# Patient Record
Sex: FEMALE | Race: WHITE | NOT HISPANIC OR LATINO | Employment: OTHER
[De-identification: names, ages, dates, MRNs, and addresses within clinical notes are randomized per-mention and may not be internally consistent; named-entity substitution may affect disease eponyms.]

---

## 2017-06-19 ENCOUNTER — RECORDS - HEALTHEAST (OUTPATIENT)
Dept: ADMINISTRATIVE | Facility: OTHER | Age: 67
End: 2017-06-19

## 2017-07-31 ENCOUNTER — OFFICE VISIT - HEALTHEAST (OUTPATIENT)
Dept: FAMILY MEDICINE | Facility: CLINIC | Age: 67
End: 2017-07-31

## 2017-07-31 ENCOUNTER — RECORDS - HEALTHEAST (OUTPATIENT)
Dept: ADMINISTRATIVE | Facility: OTHER | Age: 67
End: 2017-07-31

## 2017-07-31 ENCOUNTER — COMMUNICATION - HEALTHEAST (OUTPATIENT)
Dept: OBGYN | Facility: HOSPITAL | Age: 67
End: 2017-07-31

## 2017-07-31 DIAGNOSIS — B02.9 SHINGLES: ICD-10-CM

## 2017-07-31 DIAGNOSIS — R51.9 LEFT-SIDED HEADACHE: ICD-10-CM

## 2017-08-01 ENCOUNTER — COMMUNICATION - HEALTHEAST (OUTPATIENT)
Dept: SCHEDULING | Facility: CLINIC | Age: 67
End: 2017-08-01

## 2017-08-02 ENCOUNTER — COMMUNICATION - HEALTHEAST (OUTPATIENT)
Dept: OBGYN | Facility: HOSPITAL | Age: 67
End: 2017-08-02

## 2017-08-02 ENCOUNTER — RECORDS - HEALTHEAST (OUTPATIENT)
Dept: ADMINISTRATIVE | Facility: OTHER | Age: 67
End: 2017-08-02

## 2017-08-02 ENCOUNTER — OFFICE VISIT - HEALTHEAST (OUTPATIENT)
Dept: FAMILY MEDICINE | Facility: CLINIC | Age: 67
End: 2017-08-02

## 2017-08-02 DIAGNOSIS — R51.9 LEFT-SIDED HEADACHE: ICD-10-CM

## 2017-08-02 DIAGNOSIS — B02.9 SHINGLES: ICD-10-CM

## 2017-08-02 DIAGNOSIS — R11.0 NAUSEA: ICD-10-CM

## 2017-08-03 ENCOUNTER — OFFICE VISIT - HEALTHEAST (OUTPATIENT)
Dept: INTERNAL MEDICINE | Facility: CLINIC | Age: 67
End: 2017-08-03

## 2017-08-03 DIAGNOSIS — B02.8 HERPES ZOSTER WITH OTHER COMPLICATION: ICD-10-CM

## 2017-08-10 ENCOUNTER — OFFICE VISIT - HEALTHEAST (OUTPATIENT)
Dept: INTERNAL MEDICINE | Facility: CLINIC | Age: 67
End: 2017-08-10

## 2017-08-10 DIAGNOSIS — H61.22 IMPACTED CERUMEN OF LEFT EAR: ICD-10-CM

## 2017-08-10 DIAGNOSIS — B02.23 ACUTE HERPES ZOSTER NEUROPATHY: ICD-10-CM

## 2017-08-15 ENCOUNTER — OFFICE VISIT - HEALTHEAST (OUTPATIENT)
Dept: FAMILY MEDICINE | Facility: CLINIC | Age: 67
End: 2017-08-15

## 2017-08-15 DIAGNOSIS — Z12.31 VISIT FOR SCREENING MAMMOGRAM: ICD-10-CM

## 2017-08-15 DIAGNOSIS — R53.83 FATIGUE, UNSPECIFIED TYPE: ICD-10-CM

## 2017-08-15 DIAGNOSIS — Z12.11 SCREENING FOR COLON CANCER: ICD-10-CM

## 2017-08-15 DIAGNOSIS — E66.01 MORBID OBESITY WITH BMI OF 40.0-44.9, ADULT (H): ICD-10-CM

## 2017-08-15 DIAGNOSIS — Z13.1 SCREENING FOR DIABETES MELLITUS: ICD-10-CM

## 2017-08-15 DIAGNOSIS — B02.23 ACUTE HERPES ZOSTER NEUROPATHY: ICD-10-CM

## 2017-08-15 LAB — HBA1C MFR BLD: 5.6 % (ref 3.5–6)

## 2017-08-15 ASSESSMENT — MIFFLIN-ST. JEOR: SCORE: 1521.2

## 2017-08-21 ENCOUNTER — AMBULATORY - HEALTHEAST (OUTPATIENT)
Dept: LAB | Facility: CLINIC | Age: 67
End: 2017-08-21

## 2017-08-21 DIAGNOSIS — Z12.11 COLON CANCER SCREENING: ICD-10-CM

## 2017-10-12 ENCOUNTER — AMBULATORY - HEALTHEAST (OUTPATIENT)
Dept: LAB | Facility: CLINIC | Age: 67
End: 2017-10-12

## 2017-10-12 DIAGNOSIS — B35.1 ONYCHOMYCOSIS: ICD-10-CM

## 2017-10-23 ENCOUNTER — RECORDS - HEALTHEAST (OUTPATIENT)
Dept: ADMINISTRATIVE | Facility: OTHER | Age: 67
End: 2017-10-23

## 2017-11-02 ENCOUNTER — RECORDS - HEALTHEAST (OUTPATIENT)
Dept: ADMINISTRATIVE | Facility: OTHER | Age: 67
End: 2017-11-02

## 2017-12-01 ENCOUNTER — RECORDS - HEALTHEAST (OUTPATIENT)
Dept: ADMINISTRATIVE | Facility: OTHER | Age: 67
End: 2017-12-01

## 2017-12-14 ENCOUNTER — RECORDS - HEALTHEAST (OUTPATIENT)
Dept: ADMINISTRATIVE | Facility: OTHER | Age: 67
End: 2017-12-14

## 2018-02-08 ENCOUNTER — RECORDS - HEALTHEAST (OUTPATIENT)
Dept: ADMINISTRATIVE | Facility: OTHER | Age: 68
End: 2018-02-08

## 2018-02-13 ENCOUNTER — RECORDS - HEALTHEAST (OUTPATIENT)
Dept: ADMINISTRATIVE | Facility: OTHER | Age: 68
End: 2018-02-13

## 2018-02-22 ENCOUNTER — RECORDS - HEALTHEAST (OUTPATIENT)
Dept: ADMINISTRATIVE | Facility: OTHER | Age: 68
End: 2018-02-22

## 2018-03-02 ENCOUNTER — RECORDS - HEALTHEAST (OUTPATIENT)
Dept: ADMINISTRATIVE | Facility: OTHER | Age: 68
End: 2018-03-02

## 2018-03-16 ENCOUNTER — RECORDS - HEALTHEAST (OUTPATIENT)
Dept: ADMINISTRATIVE | Facility: OTHER | Age: 68
End: 2018-03-16

## 2018-03-22 ENCOUNTER — RECORDS - HEALTHEAST (OUTPATIENT)
Dept: ADMINISTRATIVE | Facility: OTHER | Age: 68
End: 2018-03-22

## 2018-04-19 ENCOUNTER — RECORDS - HEALTHEAST (OUTPATIENT)
Dept: ADMINISTRATIVE | Facility: OTHER | Age: 68
End: 2018-04-19

## 2018-06-22 ENCOUNTER — RECORDS - HEALTHEAST (OUTPATIENT)
Dept: ADMINISTRATIVE | Facility: OTHER | Age: 68
End: 2018-06-22

## 2018-06-28 ENCOUNTER — AMBULATORY - HEALTHEAST (OUTPATIENT)
Dept: LAB | Facility: CLINIC | Age: 68
End: 2018-06-28

## 2018-06-28 DIAGNOSIS — M76.62 TENDONITIS, ACHILLES, LEFT: ICD-10-CM

## 2018-06-29 LAB
25(OH)D3 SERPL-MCNC: 31.5 NG/ML (ref 30–80)
25(OH)D3 SERPL-MCNC: 31.5 NG/ML (ref 30–80)

## 2018-07-05 ENCOUNTER — COMMUNICATION - HEALTHEAST (OUTPATIENT)
Dept: SCHEDULING | Facility: CLINIC | Age: 68
End: 2018-07-05

## 2018-07-06 ENCOUNTER — OFFICE VISIT - HEALTHEAST (OUTPATIENT)
Dept: FAMILY MEDICINE | Facility: CLINIC | Age: 68
End: 2018-07-06

## 2018-07-06 DIAGNOSIS — R23.3 PETECHIAE OR ECCHYMOSES: ICD-10-CM

## 2018-07-06 DIAGNOSIS — R23.3 EASY BRUISING: ICD-10-CM

## 2018-07-06 DIAGNOSIS — D69.6 THROMBOCYTOPENIA (H): ICD-10-CM

## 2018-07-06 DIAGNOSIS — R23.3 PETECHIAE OF PALATE: ICD-10-CM

## 2018-07-06 LAB
APTT PPP: 29 SECONDS (ref 24–37)
BASOPHILS # BLD AUTO: 0.1 THOU/UL (ref 0–0.2)
BASOPHILS NFR BLD AUTO: 1 % (ref 0–2)
EOSINOPHIL # BLD AUTO: 0.1 THOU/UL (ref 0–0.4)
EOSINOPHIL NFR BLD AUTO: 1 % (ref 0–6)
ERYTHROCYTE [DISTWIDTH] IN BLOOD BY AUTOMATED COUNT: 13.4 % (ref 11–14.5)
HCT VFR BLD AUTO: 42.7 % (ref 35–47)
HGB BLD-MCNC: 14.5 G/DL (ref 12–16)
INR PPP: 1 (ref 0.9–1.1)
LYMPHOCYTES # BLD AUTO: 2.2 THOU/UL (ref 0.8–4.4)
LYMPHOCYTES NFR BLD AUTO: 30 % (ref 20–40)
MCH RBC QN AUTO: 30.1 PG (ref 27–34)
MCHC RBC AUTO-ENTMCNC: 34 G/DL (ref 32–36)
MCV RBC AUTO: 89 FL (ref 80–100)
MONOCYTES # BLD AUTO: 0.8 THOU/UL (ref 0–0.9)
MONOCYTES NFR BLD AUTO: 11 % (ref 2–10)
NEUTROPHILS # BLD AUTO: 4.1 THOU/UL (ref 2–7.7)
NEUTROPHILS NFR BLD AUTO: 57 % (ref 50–70)
PLATELET # BLD AUTO: 0 THOU/UL (ref 140–440)
PMV BLD AUTO: ABNORMAL FL (ref 8.5–12.5)
RBC # BLD AUTO: 4.81 MILL/UL (ref 3.8–5.4)
WBC: 7.3 THOU/UL (ref 4–11)

## 2018-07-06 ASSESSMENT — MIFFLIN-ST. JEOR: SCORE: 1424.47

## 2018-07-10 ENCOUNTER — OFFICE VISIT - HEALTHEAST (OUTPATIENT)
Dept: FAMILY MEDICINE | Facility: CLINIC | Age: 68
End: 2018-07-10

## 2018-07-10 ENCOUNTER — AMBULATORY - HEALTHEAST (OUTPATIENT)
Dept: ONCOLOGY | Facility: HOSPITAL | Age: 68
End: 2018-07-10

## 2018-07-10 DIAGNOSIS — D69.3 ACUTE ITP (H): ICD-10-CM

## 2018-07-10 DIAGNOSIS — Z09 HOSPITAL DISCHARGE FOLLOW-UP: ICD-10-CM

## 2018-07-10 DIAGNOSIS — E66.812 OBESITY, CLASS II, BMI 35-39.9: ICD-10-CM

## 2018-07-10 LAB
ALBUMIN SERPL-MCNC: 3.9 G/DL (ref 3.5–5)
ALP SERPL-CCNC: 92 U/L (ref 45–120)
ALT SERPL W P-5'-P-CCNC: 25 U/L (ref 0–45)
ANION GAP SERPL CALCULATED.3IONS-SCNC: 10 MMOL/L (ref 5–18)
AST SERPL W P-5'-P-CCNC: 16 U/L (ref 0–40)
BASOPHILS # BLD AUTO: 0.1 THOU/UL (ref 0–0.2)
BASOPHILS NFR BLD AUTO: 0 % (ref 0–2)
BILIRUB SERPL-MCNC: 2.4 MG/DL (ref 0–1)
BUN SERPL-MCNC: 23 MG/DL (ref 8–22)
CALCIUM SERPL-MCNC: 10.2 MG/DL (ref 8.5–10.5)
CHLORIDE BLD-SCNC: 108 MMOL/L (ref 98–107)
CO2 SERPL-SCNC: 22 MMOL/L (ref 22–31)
CREAT SERPL-MCNC: 0.86 MG/DL (ref 0.6–1.1)
EOSINOPHIL # BLD AUTO: 0 THOU/UL (ref 0–0.4)
EOSINOPHIL NFR BLD AUTO: 0 % (ref 0–6)
ERYTHROCYTE [DISTWIDTH] IN BLOOD BY AUTOMATED COUNT: 14.2 % (ref 11–14.5)
GFR SERPL CREATININE-BSD FRML MDRD: >60 ML/MIN/1.73M2
GLUCOSE BLD-MCNC: 99 MG/DL (ref 70–125)
HCT VFR BLD AUTO: 38 % (ref 35–47)
HGB BLD-MCNC: 13.3 G/DL (ref 12–16)
LYMPHOCYTES # BLD AUTO: 2 THOU/UL (ref 0.8–4.4)
LYMPHOCYTES NFR BLD AUTO: 14 % (ref 20–40)
MCH RBC QN AUTO: 30.9 PG (ref 27–34)
MCHC RBC AUTO-ENTMCNC: 35 G/DL (ref 32–36)
MCV RBC AUTO: 88 FL (ref 80–100)
MONOCYTES # BLD AUTO: 1.6 THOU/UL (ref 0–0.9)
MONOCYTES NFR BLD AUTO: 11 % (ref 2–10)
NEUTROPHILS # BLD AUTO: 10.2 THOU/UL (ref 2–7.7)
NEUTROPHILS NFR BLD AUTO: 75 % (ref 50–70)
PLATELET # BLD AUTO: 75 THOU/UL (ref 140–440)
PMV BLD AUTO: 12.6 FL (ref 8.5–12.5)
POTASSIUM BLD-SCNC: 3.7 MMOL/L (ref 3.5–5)
PROT SERPL-MCNC: 6.3 G/DL (ref 6–8)
RBC # BLD AUTO: 4.31 MILL/UL (ref 3.8–5.4)
SODIUM SERPL-SCNC: 140 MMOL/L (ref 136–145)
WBC: 14.4 THOU/UL (ref 4–11)

## 2018-07-10 ASSESSMENT — MIFFLIN-ST. JEOR: SCORE: 1418.57

## 2018-07-11 ENCOUNTER — AMBULATORY - HEALTHEAST (OUTPATIENT)
Dept: LAB | Facility: CLINIC | Age: 68
End: 2018-07-11

## 2018-07-11 DIAGNOSIS — D69.3 ACUTE ITP (H): ICD-10-CM

## 2018-07-12 LAB
H PYLORI AG STL QL IA: ABNORMAL
REPORT STATUS: ABNORMAL
SPECIMEN DESCRIPTION: ABNORMAL

## 2018-07-25 ENCOUNTER — AMBULATORY - HEALTHEAST (OUTPATIENT)
Dept: INFUSION THERAPY | Facility: HOSPITAL | Age: 68
End: 2018-07-25

## 2018-07-25 ENCOUNTER — OFFICE VISIT - HEALTHEAST (OUTPATIENT)
Dept: ONCOLOGY | Facility: HOSPITAL | Age: 68
End: 2018-07-25

## 2018-07-25 DIAGNOSIS — D69.3 ACUTE ITP (H): ICD-10-CM

## 2018-07-25 DIAGNOSIS — R23.3 PETECHIAE OF PALATE: ICD-10-CM

## 2018-07-25 DIAGNOSIS — R23.3 EASY BRUISING: ICD-10-CM

## 2018-07-25 DIAGNOSIS — D69.3 ITP SECONDARY TO INFECTION (H): ICD-10-CM

## 2018-07-25 DIAGNOSIS — R23.3 PETECHIAE OR ECCHYMOSES: ICD-10-CM

## 2018-07-25 DIAGNOSIS — A04.8 HELICOBACTER PYLORI (H. PYLORI) INFECTION: ICD-10-CM

## 2018-07-25 LAB
BASOPHILS # BLD AUTO: 0 THOU/UL (ref 0–0.2)
BASOPHILS NFR BLD AUTO: 0 % (ref 0–2)
EOSINOPHIL # BLD AUTO: 0 THOU/UL (ref 0–0.4)
EOSINOPHIL NFR BLD AUTO: 0 % (ref 0–6)
ERYTHROCYTE [DISTWIDTH] IN BLOOD BY AUTOMATED COUNT: 15.3 % (ref 11–14.5)
HCT VFR BLD AUTO: 37.8 % (ref 35–47)
HGB BLD-MCNC: 12.3 G/DL (ref 12–16)
LYMPHOCYTES # BLD AUTO: 3.7 THOU/UL (ref 0.8–4.4)
LYMPHOCYTES NFR BLD AUTO: 30 % (ref 20–40)
MCH RBC QN AUTO: 30.2 PG (ref 27–34)
MCHC RBC AUTO-ENTMCNC: 32.5 G/DL (ref 32–36)
MCV RBC AUTO: 93 FL (ref 80–100)
MONOCYTES # BLD AUTO: 1.3 THOU/UL (ref 0–0.9)
MONOCYTES NFR BLD AUTO: 11 % (ref 2–10)
NEUTROPHILS # BLD AUTO: 6.9 THOU/UL (ref 2–7.7)
NEUTROPHILS NFR BLD AUTO: 59 % (ref 50–70)
PLATELET # BLD AUTO: 95 THOU/UL (ref 140–440)
PMV BLD AUTO: 11.4 FL (ref 8.5–12.5)
RBC # BLD AUTO: 4.07 MILL/UL (ref 3.8–5.4)
WBC: 12.3 THOU/UL (ref 4–11)

## 2018-07-26 ENCOUNTER — COMMUNICATION - HEALTHEAST (OUTPATIENT)
Dept: ONCOLOGY | Facility: HOSPITAL | Age: 68
End: 2018-07-26

## 2018-08-08 ENCOUNTER — AMBULATORY - HEALTHEAST (OUTPATIENT)
Dept: INFUSION THERAPY | Facility: HOSPITAL | Age: 68
End: 2018-08-08

## 2018-08-08 ENCOUNTER — OFFICE VISIT - HEALTHEAST (OUTPATIENT)
Dept: ONCOLOGY | Facility: HOSPITAL | Age: 68
End: 2018-08-08

## 2018-08-08 DIAGNOSIS — A04.8 HELICOBACTER PYLORI (H. PYLORI) INFECTION: ICD-10-CM

## 2018-08-08 DIAGNOSIS — D69.3 ITP SECONDARY TO INFECTION (H): ICD-10-CM

## 2018-08-08 DIAGNOSIS — D69.3 ACUTE ITP (H): ICD-10-CM

## 2018-08-08 LAB
ALBUMIN SERPL-MCNC: 3.9 G/DL (ref 3.5–5)
ALP SERPL-CCNC: 95 U/L (ref 45–120)
ALT SERPL W P-5'-P-CCNC: 31 U/L (ref 0–45)
ANION GAP SERPL CALCULATED.3IONS-SCNC: 9 MMOL/L (ref 5–18)
AST SERPL W P-5'-P-CCNC: 22 U/L (ref 0–40)
BASOPHILS # BLD AUTO: 0.1 THOU/UL (ref 0–0.2)
BASOPHILS NFR BLD AUTO: 1 % (ref 0–2)
BILIRUB SERPL-MCNC: 1.5 MG/DL (ref 0–1)
BUN SERPL-MCNC: 16 MG/DL (ref 8–22)
CALCIUM SERPL-MCNC: 10.3 MG/DL (ref 8.5–10.5)
CHLORIDE BLD-SCNC: 106 MMOL/L (ref 98–107)
CO2 SERPL-SCNC: 27 MMOL/L (ref 22–31)
CREAT SERPL-MCNC: 0.97 MG/DL (ref 0.6–1.1)
EOSINOPHIL # BLD AUTO: 0.1 THOU/UL (ref 0–0.4)
EOSINOPHIL NFR BLD AUTO: 1 % (ref 0–6)
ERYTHROCYTE [DISTWIDTH] IN BLOOD BY AUTOMATED COUNT: 15.1 % (ref 11–14.5)
GFR SERPL CREATININE-BSD FRML MDRD: 57 ML/MIN/1.73M2
GLUCOSE BLD-MCNC: 97 MG/DL (ref 70–125)
HCT VFR BLD AUTO: 41.3 % (ref 35–47)
HGB BLD-MCNC: 13.6 G/DL (ref 12–16)
LYMPHOCYTES # BLD AUTO: 2 THOU/UL (ref 0.8–4.4)
LYMPHOCYTES NFR BLD AUTO: 38 % (ref 20–40)
MCH RBC QN AUTO: 30.4 PG (ref 27–34)
MCHC RBC AUTO-ENTMCNC: 32.9 G/DL (ref 32–36)
MCV RBC AUTO: 92 FL (ref 80–100)
MONOCYTES # BLD AUTO: 0.7 THOU/UL (ref 0–0.9)
MONOCYTES NFR BLD AUTO: 13 % (ref 2–10)
NEUTROPHILS # BLD AUTO: 2.4 THOU/UL (ref 2–7.7)
NEUTROPHILS NFR BLD AUTO: 47 % (ref 50–70)
PLATELET # BLD AUTO: 158 THOU/UL (ref 140–440)
PMV BLD AUTO: 11 FL (ref 8.5–12.5)
POTASSIUM BLD-SCNC: 3.6 MMOL/L (ref 3.5–5)
PROT SERPL-MCNC: 6.5 G/DL (ref 6–8)
RBC # BLD AUTO: 4.47 MILL/UL (ref 3.8–5.4)
SODIUM SERPL-SCNC: 142 MMOL/L (ref 136–145)
WBC: 5.4 THOU/UL (ref 4–11)

## 2018-08-09 ENCOUNTER — COMMUNICATION - HEALTHEAST (OUTPATIENT)
Dept: FAMILY MEDICINE | Facility: CLINIC | Age: 68
End: 2018-08-09

## 2018-08-10 ENCOUNTER — COMMUNICATION - HEALTHEAST (OUTPATIENT)
Dept: ONCOLOGY | Facility: HOSPITAL | Age: 68
End: 2018-08-10

## 2018-10-01 ENCOUNTER — OFFICE VISIT - HEALTHEAST (OUTPATIENT)
Dept: FAMILY MEDICINE | Facility: CLINIC | Age: 68
End: 2018-10-01

## 2018-10-01 DIAGNOSIS — F43.21 GRIEF REACTION: ICD-10-CM

## 2018-10-01 DIAGNOSIS — F51.02 ADJUSTMENT INSOMNIA: ICD-10-CM

## 2018-10-01 DIAGNOSIS — Z12.11 SCREEN FOR COLON CANCER: ICD-10-CM

## 2018-10-01 DIAGNOSIS — Z01.818 PREOPERATIVE EXAMINATION: ICD-10-CM

## 2018-10-01 DIAGNOSIS — M67.971 DISORDER OF RIGHT ACHILLES TENDON: ICD-10-CM

## 2018-10-01 DIAGNOSIS — Z12.31 VISIT FOR SCREENING MAMMOGRAM: ICD-10-CM

## 2018-10-01 DIAGNOSIS — Z86.2 HISTORY OF ITP: ICD-10-CM

## 2018-10-01 LAB — PLATELET # BLD AUTO: 177 THOU/UL (ref 140–440)

## 2018-10-01 ASSESSMENT — MIFFLIN-ST. JEOR: SCORE: 1464.84

## 2018-11-06 ENCOUNTER — OFFICE VISIT - HEALTHEAST (OUTPATIENT)
Dept: FAMILY MEDICINE | Facility: CLINIC | Age: 68
End: 2018-11-06

## 2018-11-06 DIAGNOSIS — F43.21 GRIEF REACTION: ICD-10-CM

## 2018-11-06 DIAGNOSIS — Z86.2 HISTORY OF ITP: ICD-10-CM

## 2018-11-06 DIAGNOSIS — G47.09 OTHER INSOMNIA: ICD-10-CM

## 2018-11-06 LAB — PLATELET # BLD AUTO: 146 THOU/UL (ref 140–440)

## 2018-11-06 ASSESSMENT — MIFFLIN-ST. JEOR: SCORE: 1460.76

## 2018-12-11 ENCOUNTER — OFFICE VISIT - HEALTHEAST (OUTPATIENT)
Dept: FAMILY MEDICINE | Facility: CLINIC | Age: 68
End: 2018-12-11

## 2018-12-11 DIAGNOSIS — Z86.2 HISTORY OF ITP: ICD-10-CM

## 2018-12-11 DIAGNOSIS — Z79.899: ICD-10-CM

## 2018-12-11 DIAGNOSIS — F43.21 GRIEF REACTION: ICD-10-CM

## 2018-12-11 DIAGNOSIS — L65.9 HAIR LOSS: ICD-10-CM

## 2018-12-11 LAB
BASOPHILS # BLD AUTO: 0.1 THOU/UL (ref 0–0.2)
BASOPHILS NFR BLD AUTO: 1 % (ref 0–2)
EOSINOPHIL # BLD AUTO: 0.1 THOU/UL (ref 0–0.4)
EOSINOPHIL NFR BLD AUTO: 2 % (ref 0–6)
ERYTHROCYTE [DISTWIDTH] IN BLOOD BY AUTOMATED COUNT: 12.5 % (ref 11–14.5)
HCT VFR BLD AUTO: 39.4 % (ref 35–47)
HGB BLD-MCNC: 13.3 G/DL (ref 12–16)
LYMPHOCYTES # BLD AUTO: 2.2 THOU/UL (ref 0.8–4.4)
LYMPHOCYTES NFR BLD AUTO: 36 % (ref 20–40)
MCH RBC QN AUTO: 30.1 PG (ref 27–34)
MCHC RBC AUTO-ENTMCNC: 33.9 G/DL (ref 32–36)
MCV RBC AUTO: 89 FL (ref 80–100)
MONOCYTES # BLD AUTO: 0.6 THOU/UL (ref 0–0.9)
MONOCYTES NFR BLD AUTO: 10 % (ref 2–10)
NEUTROPHILS # BLD AUTO: 3.1 THOU/UL (ref 2–7.7)
NEUTROPHILS NFR BLD AUTO: 51 % (ref 50–70)
PLATELET # BLD AUTO: 171 THOU/UL (ref 140–440)
PLATELET # BLD AUTO: 181 THOU/UL (ref 140–440)
PMV BLD AUTO: 9.9 FL (ref 7–10)
RBC # BLD AUTO: 4.43 MILL/UL (ref 3.8–5.4)
T4 FREE SERPL-MCNC: 0.8 NG/DL (ref 0.7–1.8)
TSH SERPL DL<=0.005 MIU/L-ACNC: 5.61 UIU/ML (ref 0.3–5)
WBC: 6.2 THOU/UL (ref 4–11)

## 2018-12-11 ASSESSMENT — MIFFLIN-ST. JEOR: SCORE: 1489.39

## 2019-02-08 ENCOUNTER — COMMUNICATION - HEALTHEAST (OUTPATIENT)
Dept: FAMILY MEDICINE | Facility: CLINIC | Age: 69
End: 2019-02-08

## 2019-05-14 ENCOUNTER — COMMUNICATION - HEALTHEAST (OUTPATIENT)
Dept: FAMILY MEDICINE | Facility: CLINIC | Age: 69
End: 2019-05-14

## 2019-05-14 ENCOUNTER — OFFICE VISIT - HEALTHEAST (OUTPATIENT)
Dept: FAMILY MEDICINE | Facility: CLINIC | Age: 69
End: 2019-05-14

## 2019-05-14 DIAGNOSIS — Z01.818 PREOPERATIVE EXAMINATION: ICD-10-CM

## 2019-05-14 DIAGNOSIS — Z86.2 HISTORY OF ITP: ICD-10-CM

## 2019-05-14 DIAGNOSIS — F51.02 ADJUSTMENT INSOMNIA: ICD-10-CM

## 2019-05-14 DIAGNOSIS — E03.8 SUBCLINICAL HYPOTHYROIDISM: ICD-10-CM

## 2019-05-14 DIAGNOSIS — R03.0 ELEVATED BLOOD PRESSURE READING: ICD-10-CM

## 2019-05-14 DIAGNOSIS — H26.9 CATARACT OF BOTH EYES, UNSPECIFIED CATARACT TYPE: ICD-10-CM

## 2019-05-14 LAB
ERYTHROCYTE [DISTWIDTH] IN BLOOD BY AUTOMATED COUNT: 12.2 % (ref 11–14.5)
HCT VFR BLD AUTO: 40 % (ref 35–47)
HGB BLD-MCNC: 13.1 G/DL (ref 12–16)
MCH RBC QN AUTO: 29.3 PG (ref 27–34)
MCHC RBC AUTO-ENTMCNC: 32.8 G/DL (ref 32–36)
MCV RBC AUTO: 89 FL (ref 80–100)
PLATELET # BLD AUTO: 173 THOU/UL (ref 140–440)
PMV BLD AUTO: 9.6 FL (ref 7–10)
RBC # BLD AUTO: 4.47 MILL/UL (ref 3.8–5.4)
TSH SERPL DL<=0.005 MIU/L-ACNC: 4.05 UIU/ML (ref 0.3–5)
WBC: 5.5 THOU/UL (ref 4–11)

## 2019-05-14 ASSESSMENT — MIFFLIN-ST. JEOR: SCORE: 1510.2

## 2019-07-29 ENCOUNTER — COMMUNICATION - HEALTHEAST (OUTPATIENT)
Dept: FAMILY MEDICINE | Facility: CLINIC | Age: 69
End: 2019-07-29

## 2019-07-29 DIAGNOSIS — F51.02 ADJUSTMENT INSOMNIA: ICD-10-CM

## 2019-07-29 RX ORDER — TRAZODONE HYDROCHLORIDE 50 MG/1
25-150 TABLET, FILM COATED ORAL
Qty: 60 TABLET | Refills: 1 | Status: SHIPPED | OUTPATIENT
Start: 2019-07-29

## 2019-09-09 ENCOUNTER — OFFICE VISIT - HEALTHEAST (OUTPATIENT)
Dept: FAMILY MEDICINE | Facility: CLINIC | Age: 69
End: 2019-09-09

## 2019-09-09 DIAGNOSIS — M67.972 ACHILLES TENDON DISORDER, LEFT: ICD-10-CM

## 2019-09-09 DIAGNOSIS — H26.9 CATARACT OF BOTH EYES, UNSPECIFIED CATARACT TYPE: ICD-10-CM

## 2019-09-09 DIAGNOSIS — Z86.2 HISTORY OF ITP: ICD-10-CM

## 2019-09-09 DIAGNOSIS — Z01.818 PREOPERATIVE EXAMINATION: ICD-10-CM

## 2019-09-09 LAB
ERYTHROCYTE [DISTWIDTH] IN BLOOD BY AUTOMATED COUNT: 13.5 % (ref 11–14.5)
HCT VFR BLD AUTO: 44 % (ref 35–47)
HGB BLD-MCNC: 14.3 G/DL (ref 12–16)
MCH RBC QN AUTO: 30.2 PG (ref 27–34)
MCHC RBC AUTO-ENTMCNC: 32.5 G/DL (ref 32–36)
MCV RBC AUTO: 93 FL (ref 80–100)
PLATELET # BLD AUTO: 189 THOU/UL (ref 140–440)
PMV BLD AUTO: 12.5 FL (ref 8.5–12.5)
RBC # BLD AUTO: 4.74 MILL/UL (ref 3.8–5.4)
WBC: 6.8 THOU/UL (ref 4–11)

## 2019-09-09 ASSESSMENT — MIFFLIN-ST. JEOR: SCORE: 1493.41

## 2019-09-10 ENCOUNTER — COMMUNICATION - HEALTHEAST (OUTPATIENT)
Dept: FAMILY MEDICINE | Facility: CLINIC | Age: 69
End: 2019-09-10

## 2019-11-06 ENCOUNTER — COMMUNICATION - HEALTHEAST (OUTPATIENT)
Dept: FAMILY MEDICINE | Facility: CLINIC | Age: 69
End: 2019-11-06

## 2019-11-07 ENCOUNTER — OFFICE VISIT - HEALTHEAST (OUTPATIENT)
Dept: FAMILY MEDICINE | Facility: CLINIC | Age: 69
End: 2019-11-07

## 2019-11-07 DIAGNOSIS — J20.9 ACUTE BRONCHITIS, UNSPECIFIED ORGANISM: ICD-10-CM

## 2019-11-07 ASSESSMENT — MIFFLIN-ST. JEOR: SCORE: 1483.44

## 2019-12-16 ENCOUNTER — OFFICE VISIT - HEALTHEAST (OUTPATIENT)
Dept: FAMILY MEDICINE | Facility: CLINIC | Age: 69
End: 2019-12-16

## 2019-12-16 DIAGNOSIS — M67.972 DISORDER OF LEFT ACHILLES TENDON: ICD-10-CM

## 2019-12-16 DIAGNOSIS — Z01.818 PREOPERATIVE EXAMINATION: ICD-10-CM

## 2019-12-16 DIAGNOSIS — Z86.2 HISTORY OF ITP: ICD-10-CM

## 2019-12-16 LAB
ERYTHROCYTE [DISTWIDTH] IN BLOOD BY AUTOMATED COUNT: 13.5 % (ref 11–14.5)
HCT VFR BLD AUTO: 41.8 % (ref 35–47)
HGB BLD-MCNC: 13.7 G/DL (ref 12–16)
MCH RBC QN AUTO: 30 PG (ref 27–34)
MCHC RBC AUTO-ENTMCNC: 32.8 G/DL (ref 32–36)
MCV RBC AUTO: 92 FL (ref 80–100)
PLATELET # BLD AUTO: 200 THOU/UL (ref 140–440)
PMV BLD AUTO: 11.6 FL (ref 8.5–12.5)
RBC # BLD AUTO: 4.56 MILL/UL (ref 3.8–5.4)
WBC: 6.3 THOU/UL (ref 4–11)

## 2019-12-16 ASSESSMENT — MIFFLIN-ST. JEOR: SCORE: 1491.6

## 2019-12-17 LAB
25(OH)D3 SERPL-MCNC: 39.5 NG/ML (ref 30–80)
25(OH)D3 SERPL-MCNC: 39.5 NG/ML (ref 30–80)

## 2020-03-09 ENCOUNTER — HOSPITAL ENCOUNTER (OUTPATIENT)
Dept: ULTRASOUND IMAGING | Facility: CLINIC | Age: 70
Discharge: HOME | End: 2020-03-09
Attending: ORTHOPAEDIC SURGERY | Admitting: ORTHOPAEDIC SURGERY
Payer: COMMERCIAL

## 2020-03-09 ENCOUNTER — MEDICAL CORRESPONDENCE (OUTPATIENT)
Dept: HEALTH INFORMATION MANAGEMENT | Facility: CLINIC | Age: 70
End: 2020-03-09

## 2020-03-09 DIAGNOSIS — R60.9 SWELLING: ICD-10-CM

## 2020-03-09 DIAGNOSIS — M79.662 PAIN OF LEFT CALF: ICD-10-CM

## 2020-03-09 PROCEDURE — 93971 EXTREMITY STUDY: CPT | Mod: LT

## 2020-03-10 ENCOUNTER — AMBULATORY - HEALTHEAST (OUTPATIENT)
Dept: LAB | Facility: CLINIC | Age: 70
End: 2020-03-10

## 2020-03-10 ENCOUNTER — COMMUNICATION - HEALTHEAST (OUTPATIENT)
Dept: FAMILY MEDICINE | Facility: CLINIC | Age: 70
End: 2020-03-10

## 2020-03-10 DIAGNOSIS — R60.0 LOCALIZED EDEMA: ICD-10-CM

## 2020-03-10 LAB
BASOPHILS # BLD AUTO: 0 THOU/UL (ref 0–0.2)
BASOPHILS NFR BLD AUTO: 1 % (ref 0–2)
C REACTIVE PROTEIN LHE: 0.6 MG/DL (ref 0–0.8)
EOSINOPHIL # BLD AUTO: 0.1 THOU/UL (ref 0–0.4)
EOSINOPHIL NFR BLD AUTO: 1 % (ref 0–6)
ERYTHROCYTE [DISTWIDTH] IN BLOOD BY AUTOMATED COUNT: 12.4 % (ref 11–14.5)
ERYTHROCYTE [SEDIMENTATION RATE] IN BLOOD BY WESTERGREN METHOD: 13 MM/HR (ref 0–20)
HCT VFR BLD AUTO: 43.1 % (ref 35–47)
HGB BLD-MCNC: 14.5 G/DL (ref 12–16)
LYMPHOCYTES # BLD AUTO: 2.4 THOU/UL (ref 0.8–4.4)
LYMPHOCYTES NFR BLD AUTO: 36 % (ref 20–40)
MCH RBC QN AUTO: 30.3 PG (ref 27–34)
MCHC RBC AUTO-ENTMCNC: 33.6 G/DL (ref 32–36)
MCV RBC AUTO: 90 FL (ref 80–100)
MONOCYTES # BLD AUTO: 0.6 THOU/UL (ref 0–0.9)
MONOCYTES NFR BLD AUTO: 9 % (ref 2–10)
NEUTROPHILS # BLD AUTO: 3.6 THOU/UL (ref 2–7.7)
NEUTROPHILS NFR BLD AUTO: 54 % (ref 50–70)
PLATELET # BLD AUTO: 201 THOU/UL (ref 140–440)
PMV BLD AUTO: 9.1 FL (ref 7–10)
RBC # BLD AUTO: 4.79 MILL/UL (ref 3.8–5.4)
WBC: 6.7 THOU/UL (ref 4–11)

## 2021-04-28 ENCOUNTER — OFFICE VISIT - HEALTHEAST (OUTPATIENT)
Dept: FAMILY MEDICINE | Facility: CLINIC | Age: 71
End: 2021-04-28

## 2021-04-28 ENCOUNTER — COMMUNICATION - HEALTHEAST (OUTPATIENT)
Dept: FAMILY MEDICINE | Facility: CLINIC | Age: 71
End: 2021-04-28

## 2021-04-28 DIAGNOSIS — Z20.822 SUSPECTED 2019 NOVEL CORONAVIRUS INFECTION: ICD-10-CM

## 2021-04-28 RX ORDER — CODEINE PHOSPHATE AND GUAIFENESIN 10; 100 MG/5ML; MG/5ML
5 SOLUTION ORAL 2 TIMES DAILY PRN
Qty: 240 ML | Refills: 0 | Status: SHIPPED | OUTPATIENT
Start: 2021-04-28

## 2021-05-28 ENCOUNTER — RECORDS - HEALTHEAST (OUTPATIENT)
Dept: ADMINISTRATIVE | Facility: CLINIC | Age: 71
End: 2021-05-28

## 2021-05-28 NOTE — PROGRESS NOTES
Preoperative Exam    Scheduled Procedure: cataract surgery  Surgery Date:  05/22/19  Surgery Location: [facility]  Surgeon:  Scooter Gerber    Assessment/Plan:     1. Preoperative examination  2. Cataract of both eyes, unspecified cataract type  No ASA or NSAID use within 7 days of surgery.    3. History of ITP  Recheck CBC today, which was normal  - HM2(CBC w/o Differential)    4. Subclinical hypothyroidism  Will check thyroid cascade again, labs pending, though can plan to proceed with surgery.  - Thyroid Cascade    5. Elevated blood pressure reading  It was improved on recheck today.  We discussed increasing physical activity, weight loss, decreasing salt intake.  We will continue to monitor at future visits.    Surgical Procedure Risk: Low (reported cardiac risk generally < 1%)  Have you had prior anesthesia?: Yes  Have you or any family members had a previous anesthesia reaction:  No  Do you or any family members have a history of a clotting or bleeding disorder?: Thrombocetopenia  Cardiac Risk Assessment: no increased risk for major cardiac complications    Patient approved for surgery with general or local anesthesia.    Functional Status: Independent  Patient plans to recover at home with family.     Subjective:      Joan E Mucha is a 68 y.o. female who presents for a preoperative consultation.  She is planning to have right cataract removal and new lens placement on May 22, then will have left cataract removal with [place] placement on June 5, as she has had history of worsening vision over the past several years.    Patient had a history of ITP in July, 2018, which likely was associated with H. pylori infection.  We have had multiple platelet rechecks, which have been stable.  She denies any bleeding symptoms.    Of note, she also had slightly elevated TSH in December with low normal free T4.  We have discussed rechecking TSH in 6 to 12 months, patient is interested in rechecking those labs  today since she is here.  She denies any symptoms such as additional hair loss, and she has had some weight gain, though this is not unexplained.    All other systems reviewed and are negative, other than those listed in the HPI.    Pertinent History  Do you have difficulty breathing or chest pain after walking up a flight of stairs: No  History of obstructive sleep apnea: No  Steroid use in the last 6 months: No  Frequent Aspirin/NSAID use: No  Prior Blood Transfusion: No  Prior Blood Transfusion Reaction: No  If for some reason prior to, during or after the procedure, if it is medically indicated, would you be willing to have a blood transfusion?:  There is no transfusion refusal.    Current Outpatient Medications   Medication Sig Dispense Refill     calcium-vitamin D (CALCIUM-VITAMIN D) 500 mg(1,250mg) -200 unit per tablet Take 1 tablet by mouth 2 (two) times a day.       fluticasone propionate (FLONASE) 50 mcg/actuation nasal spray Apply 1 spray into each nostril daily.       multivitamin with minerals (HAIR,SKIN AND NAILS) tablet Take 1 tablet by mouth at bedtime.       ranitidine (ZANTAC) 150 MG tablet Take 150 mg by mouth 2 (two) times a day.       traZODone (DESYREL) 50 MG tablet Take 0.5-3 tablets ( mg total) by mouth at bedtime as needed for sleep. 60 tablet 1     No current facility-administered medications for this visit.         Allergies   Allergen Reactions     Cefuroxime Axetil      Syncope       Sulfa (Sulfonamide Antibiotics) Hives       Patient Active Problem List   Diagnosis     Arthritis     Headache     Allergies     Obesity     Vertigo     Lump Or Mass In Breast     Obesity, Class II, BMI 35-39.9     Helicobacter pylori (H. pylori) infection       Past Medical History:   Diagnosis Date     Acute ITP (H)      History of ITP        Past Surgical History:   Procedure Laterality Date     APPENDECTOMY        SECTION       FOOT SURGERY Bilateral     tenex removal     NOSE SURGERY       "reconstruction       Social History     Socioeconomic History     Marital status:      Spouse name: Not on file     Number of children: Not on file     Years of education: Not on file     Highest education level: Not on file   Occupational History     Not on file   Social Needs     Financial resource strain: Not on file     Food insecurity:     Worry: Not on file     Inability: Not on file     Transportation needs:     Medical: Not on file     Non-medical: Not on file   Tobacco Use     Smoking status: Never Smoker     Smokeless tobacco: Never Used   Substance and Sexual Activity     Alcohol use: Yes     Comment: rarely     Drug use: No     Sexual activity: Never     Partners: Male   Lifestyle     Physical activity:     Days per week: Not on file     Minutes per session: Not on file     Stress: Not on file   Relationships     Social connections:     Talks on phone: Not on file     Gets together: Not on file     Attends [employer]ism service: Not on file     Active member of club or organization: Not on file     Attends meetings of clubs or organizations: Not on file     Relationship status: Not on file     Intimate partner violence:     Fear of current or ex partner: Not on file     Emotionally abused: Not on file     Physically abused: Not on file     Forced sexual activity: Not on file   Other Topics Concern     Not on file   Social History Narrative    Retired.  Worked at [employer] previously.         Patient Care Team:  Lacie Snell MD as PCP - General (Family Medicine)          Objective:     Vitals:    05/14/19 1016 05/14/19 1040   BP: 146/78 138/76   Pulse: 74    Resp: 16    Temp: 98.7  F (37.1  C)    TempSrc: Oral    Weight: (!) 222 lb 14.4 oz (101.1 kg)    Height: 5' 3\" (1.6 m)          Physical Exam:  General Appearance: Alert, cooperative, no distress, appears stated age  Head: Normocephalic, without obvious abnormality, atraumatic  Eyes: PERRL, conjunctiva/corneas clear, EOM's intact  Ears: Normal TM's " and external ear canals, both ears  Throat: Lips, mucosa, and tongue normal; teeth and gums normal  Neck: Supple, symmetrical, trachea midline, no adenopathy;  thyroid: not enlarged, symmetric, no tenderness/mass/nodules;   Back: Symmetric, no curvature, ROM normal, no CVA tenderness  Lungs: Clear to auscultation bilaterally, respirations unlabored  Heart: Regular rate and rhythm, no murmur.   Abdomen: Soft, non-tender, bowel sounds active all four quadrants,  no masses, no organomegaly  Extremities: Extremities normal, atraumatic, no cyanosis or edema  Skin: Skin color, texture, turgor normal, no rashes or lesions  Lymph nodes: Cervical, supraclavicular, and axillary nodes normal  Neurologic: Alert.   Psych: Normal affect.  Does not appear anxious or depressed.        There are no Patient Instructions on file for this visit.      Labs:  Recent Results (from the past 48 hour(s))   HM2(CBC w/o Differential)    Collection Time: 05/14/19 10:40 AM   Result Value Ref Range    WBC 5.5 4.0 - 11.0 thou/uL    RBC 4.47 3.80 - 5.40 mill/uL    Hemoglobin 13.1 12.0 - 16.0 g/dL    Hematocrit 40.0 35.0 - 47.0 %    MCV 89 80 - 100 fL    MCH 29.3 27.0 - 34.0 pg    MCHC 32.8 32.0 - 36.0 g/dL    RDW 12.2 11.0 - 14.5 %    Platelets 173 140 - 440 thou/uL    MPV 9.6 7.0 - 10.0 fL        Immunization History   Administered Date(s) Administered     Tdap 01/27/2014           Electronically signed by Lacie Snell MD 05/14/19 10:18 AM

## 2021-05-30 NOTE — TELEPHONE ENCOUNTER
Patient last seen 05/14/19    Requesting refill trazadone    RX last filled 05/14/19    Please review and advise.  Thank you so much!  Cecilia Oneal, CMA

## 2021-05-31 VITALS — WEIGHT: 229.5 LBS

## 2021-05-31 VITALS — WEIGHT: 226.2 LBS | HEIGHT: 63 IN | BODY MASS INDEX: 40.08 KG/M2

## 2021-05-31 VITALS — WEIGHT: 227 LBS

## 2021-05-31 VITALS — WEIGHT: 232.2 LBS

## 2021-05-31 VITALS — WEIGHT: 226.6 LBS

## 2021-06-01 VITALS — HEIGHT: 63 IN | WEIGHT: 204 LBS | BODY MASS INDEX: 36.14 KG/M2

## 2021-06-01 VITALS — WEIGHT: 209.6 LBS | BODY MASS INDEX: 37.13 KG/M2

## 2021-06-01 VITALS — HEIGHT: 63 IN | WEIGHT: 202.7 LBS | BODY MASS INDEX: 35.91 KG/M2

## 2021-06-01 VITALS — BODY MASS INDEX: 37.91 KG/M2 | WEIGHT: 214 LBS

## 2021-06-01 NOTE — PROGRESS NOTES
Preoperative Exam    Scheduled Procedure: eye surgery KPE PCL exchange  Surgery Date:  09/11/19  Surgery Location: [facility] [place] location [phone]    Surgeon:  Scooter Gerber M.D.    Assessment/Plan:     1. Preoperative examination  2. Cataract of both eyes, unspecified cataract type  - HM2(CBC w/o Differential)  No ASA or NSAID use within 7 days of surgery    3. History of ITP  CBC checked today, normal.    - HM2(CBC w/o Differential)    4. Achilles tendon disorder, left  Completed handicap parking pass application, for patient to have passed through the next 6 months.  Hopefully once she has surgery handicap parking pass no longer needed.    Surgical Procedure Risk: Low (reported cardiac risk generally < 1%)  Have you had prior anesthesia?: Yes  Have you or any family members had a previous anesthesia reaction:  No  Do you or any family members have a history of a clotting or bleeding disorder?: Yes: history of ITP in Summer, 2018, though has gone through multiple successful surgeries since then  Cardiac Risk Assessment: no increased risk for major cardiac complications    APPROVAL GIVEN to proceed with proposed procedure, without further diagnostic evaluation.    Functional Status: Independent  Patient plans to recover at home with family.     Subjective:      Joan E Mucha is a 69 y.o. female who presents for a preoperative consultation.  She had cataract surgery in May, however, has had some ongoing vision difficulties with new lenses, so plans to hopefully have lenses removed and new lens placed.     She had a history of ITP in summer 2018, though over the last year has had stable CBCs.  She denies any abnormal bleeding or bruising.    Patient also has a history of Achilles tendon disorder, earlier this past year had surgery of her right Achilles, and she is due for surgery for left Achilles tendon reconstruction, though has not had this scheduled yet.  As a result, he does have  difficulty walking for long distances and would like a renewal of her handicap parking pass.    All other systems reviewed and are negative, other than those listed in the HPI.    Pertinent History  Do you have difficulty breathing or chest pain after walking up a flight of stairs: No  History of obstructive sleep apnea: No  Steroid use in the last 6 months: No  Frequent Aspirin/NSAID use: No  Prior Blood Transfusion: No  Prior Blood Transfusion Reaction: No  If for some reason prior to, during or after the procedure, if it is medically indicated, would you be willing to have a blood transfusion?:  There is no transfusion refusal.    Current Outpatient Medications   Medication Sig Dispense Refill     calcium-vitamin D (CALCIUM-VITAMIN D) 500 mg(1,250mg) -200 unit per tablet Take 1 tablet by mouth 2 (two) times a day.       doxylamine (UNISOM) 25 mg tablet Take 25 mg by mouth at bedtime as needed for sleep (1/2 tablet prn).       fluticasone propionate (FLONASE) 50 mcg/actuation nasal spray Apply 1 spray into each nostril daily.       multivitamin with minerals (HAIR,SKIN AND NAILS) tablet Take 1 tablet by mouth at bedtime.       omeprazole (PRILOSEC) 20 MG capsule Take 20 mg by mouth daily before breakfast.       traZODone (DESYREL) 50 MG tablet Take 0.5-3 tablets ( mg total) by mouth at bedtime as needed for sleep. 60 tablet 1     No current facility-administered medications for this visit.         Allergies   Allergen Reactions     Cefuroxime Axetil      Syncope       Sulfa (Sulfonamide Antibiotics) Hives       Patient Active Problem List   Diagnosis     Arthritis     Headache     Allergies     Obesity     Vertigo     Lump Or Mass In Breast     Obesity, Class II, BMI 35-39.9     Helicobacter pylori (H. pylori) infection       Past Medical History:   Diagnosis Date     Acute ITP (H)      History of ITP        Past Surgical History:   Procedure Laterality Date     APPENDECTOMY        SECTION       FOOT  "SURGERY Bilateral     tenex removal     NOSE SURGERY      reconstruction       Social History     Socioeconomic History     Marital status:      Spouse name: Not on file     Number of children: Not on file     Years of education: Not on file     Highest education level: Not on file   Occupational History     Not on file   Social Needs     Financial resource strain: Not on file     Food insecurity:     Worry: Not on file     Inability: Not on file     Transportation needs:     Medical: Not on file     Non-medical: Not on file   Tobacco Use     Smoking status: Never Smoker     Smokeless tobacco: Never Used   Substance and Sexual Activity     Alcohol use: Yes     Comment: rarely     Drug use: No     Sexual activity: Never     Partners: Male   Lifestyle     Physical activity:     Days per week: Not on file     Minutes per session: Not on file     Stress: Not on file   Relationships     Social connections:     Talks on phone: Not on file     Gets together: Not on file     Attends [employer] service: Not on file     Active member of club or organization: Not on file     Attends meetings of clubs or organizations: Not on file     Relationship status: Not on file     Intimate partner violence:     Fear of current or ex partner: Not on file     Emotionally abused: Not on file     Physically abused: Not on file     Forced sexual activity: Not on file   Other Topics Concern     Not on file   Social History Narrative    Retired.  Worked at [employer] previously.         Patient Care Team:  Lacie Snell MD as PCP - General (Family Medicine)  Lacie Snell MD as Assigned PCP          Objective:     Vitals:    09/09/19 1405   BP: 132/72   Pulse: 64   Resp: 18   Temp: 98.1  F (36.7  C)   TempSrc: Oral   Weight: 219 lb 3.2 oz (99.4 kg)   Height: 5' 3\" (1.6 m)         Physical Exam:  General Appearance: Alert, cooperative, no distress, appears stated age  Head: Normocephalic, without obvious abnormality, atraumatic  Eyes: " PERRL, conjunctiva/corneas clear, EOM's intact  Ears: Normal TM's and external ear canals, both ears  Nose: Nares normal, septum midline,mucosa normal, no drainage  Throat: Lips, mucosa, and tongue normal; teeth and gums normal  Neck: Supple, symmetrical, trachea midline, no adenopathy;  thyroid: not enlarged, symmetric, no tenderness/mass/nodules;   Back: Symmetric, no curvature, ROM normal, no CVA tenderness  Lungs: Clear to auscultation bilaterally, respirations unlabored  Heart: Regular rate and rhythm, no murmur.   Abdomen: Soft, non-tender, bowel sounds active all four quadrants,  no masses, no organomegaly  Pelvic:Not examined  Extremities: Extremities normal, atraumatic, no cyanosis or edema  Skin: Skin color, texture, turgor normal, no rashes or lesions  Lymph nodes: Cervical, supraclavicular, and axillary nodes normal  Neurologic: Alert.   Psych: Normal affect.  Does not appear anxious or depressed.        Patient Instructions   No aspirin or NSAIDs (ibuprofen, Aleve, etc.) within 7 days of surgery.          Labs:  Recent Results (from the past 48 hour(s))   [facility](CBC w/o Differential)    Collection Time: 09/09/19  2:31 PM   Result Value Ref Range    WBC 6.8 4.0 - 11.0 thou/uL    RBC 4.74 3.80 - 5.40 mill/uL    Hemoglobin 14.3 12.0 - 16.0 g/dL    Hematocrit 44.0 35.0 - 47.0 %    MCV 93 80 - 100 fL    MCH 30.2 27.0 - 34.0 pg    MCHC 32.5 32.0 - 36.0 g/dL    RDW 13.5 11.0 - 14.5 %    Platelets 189 140 - 440 thou/uL    MPV 12.5 8.5 - 12.5 fL        Immunization History   Administered Date(s) Administered     Tdap 01/27/2014           Electronically signed by Lacie Snell MD 09/10/19 2:01 PM

## 2021-06-01 NOTE — TELEPHONE ENCOUNTER
1st attempt to contact patient    Left message to call back for: Amada    Information to relay to patient:  LMTCB    Patient had a pre-op yesterday and her forms are complete and have been faxed. A copy is at the  (in file behind desk area) af the [facility] for patient pickup.    Cecilia Oneal, CMA

## 2021-06-01 NOTE — TELEPHONE ENCOUNTER
Patient Returning Call  Reason for call:  Return call  Information relayed to patient:  Patient was informed of her completed pre-op forms below.  Patient has additional questions:  No  If YES, what are your questions/concerns:  N/a    Okay to leave a detailed message?: No call back needed

## 2021-06-02 VITALS — BODY MASS INDEX: 37.56 KG/M2 | HEIGHT: 63 IN | WEIGHT: 212 LBS

## 2021-06-02 VITALS — WEIGHT: 212.9 LBS | HEIGHT: 63 IN | BODY MASS INDEX: 37.72 KG/M2

## 2021-06-02 VITALS — HEIGHT: 63 IN | BODY MASS INDEX: 38.68 KG/M2 | WEIGHT: 218.31 LBS

## 2021-06-03 VITALS
TEMPERATURE: 98.6 F | HEART RATE: 68 BPM | WEIGHT: 217 LBS | BODY MASS INDEX: 38.45 KG/M2 | RESPIRATION RATE: 16 BRPM | SYSTOLIC BLOOD PRESSURE: 140 MMHG | HEIGHT: 63 IN | OXYGEN SATURATION: 98 % | DIASTOLIC BLOOD PRESSURE: 80 MMHG

## 2021-06-03 VITALS
SYSTOLIC BLOOD PRESSURE: 132 MMHG | DIASTOLIC BLOOD PRESSURE: 72 MMHG | HEART RATE: 64 BPM | HEIGHT: 63 IN | WEIGHT: 219.2 LBS | RESPIRATION RATE: 18 BRPM | TEMPERATURE: 98.1 F | BODY MASS INDEX: 38.84 KG/M2

## 2021-06-03 VITALS — WEIGHT: 222.9 LBS | BODY MASS INDEX: 39.5 KG/M2 | HEIGHT: 63 IN

## 2021-06-03 NOTE — TELEPHONE ENCOUNTER
New Appointment Needed  What is the reason for the visit:    cough and cold-fever  Provider Preference: Any available  How soon do you need to be seen?: tomorrow  Waitlist offered?: No. Declined other clinics and next openings.  Okay to leave a detailed message:  Yes

## 2021-06-03 NOTE — PROGRESS NOTES
"Assessment / Impression     1. Acute bronchitis, unspecified organism  codeine-guaiFENesin (GUAIFENESIN AC)  mg/5 mL liquid    doxycycline (VIBRA-TABS) 100 MG tablet         Plan:     She was given a prescription for doxycycline and Robitussin with codeine to be used as needed.  I encouraged rest and hydration.  If symptoms do not improve she may return to the clinic.    Subjective:      HPI: Joan E Mucha is a 69 y.o. female who presents to the clinic to be evaluated for an acute illness that started 1 week ago.  She describes having a lot of upper chest congestion and coughing symptoms.  She feels feverish and a little short of breath.  She denies wheezing.  She does not have a history of asthma.  She has been taking an over-the-counter cough syrup which has not helped.      Review of Systems  Pertinent items are noted in HPI.       Objective:     /80 (Patient Site: Left Arm, Patient Position: Sitting, Cuff Size: Adult Regular)   Pulse 68   Temp 98.6  F (37  C) (Oral)   Resp 16   Ht 5' 3\" (1.6 m)   Wt 217 lb (98.4 kg)   SpO2 98%   Breastfeeding? No   BMI 38.44 kg/m      General Appearance: Alert, cooperative, appears slightly fatigued.  Head: Normocephalic, without obvious abnormality, atraumatic.  Eyes: PERRL, conjunctiva/corneas clear, EOM's intact.  Ears: Normal TM's and external ear canals, both ears.  Throat: Slightly erythematous. No exudates.  Neck: Supple, symmetrical, trachea midline, no lymphadenopathy.  Lungs: Clear to auscultation bilaterally, respirations unlabored.  Heart:: Regular rate and rhythm.  Extremities: Extremities normal, atraumatic, no cyanosis or edema.        No results found for this or any previous visit (from the past 168 hour(s)).      "

## 2021-06-04 VITALS
BODY MASS INDEX: 38.77 KG/M2 | WEIGHT: 218.8 LBS | HEART RATE: 76 BPM | RESPIRATION RATE: 18 BRPM | DIASTOLIC BLOOD PRESSURE: 76 MMHG | HEIGHT: 63 IN | SYSTOLIC BLOOD PRESSURE: 122 MMHG

## 2021-06-04 NOTE — PROGRESS NOTES
Preoperative Exam    Scheduled Procedure: Left achilles tendon rapair  Surgery Date:  12/18/19  Surgery Location: [facility]  -[id number]  Surgeon:  Dr. Alan Gilbert     Assessment/Plan:     1. Preoperative examination  2. Disorder of left Achilles tendon  Check labs below  - HM2(CBC w/o Differential)  - Vitamin D, Total (25-Hydroxy)    3. History of ITP  CBC has been stable.  - HM2(CBC w/o Differential)    4. Body mass index (bmi) 38.0-38.9, adult   Will check vitamin D level, see results below  - Vitamin D, Total (25-Hydroxy)     Surgical Procedure Risk: Intermediate (reported cardiac risk generally 1-5%)  Have you had prior anesthesia?: Yes  Have you or any family members had a previous anesthesia reaction:  No  Do you or any family members have a history of a clotting or bleeding disorder?: Yes: ITP diagnosed last year  Cardiac Risk Assessment: no increased risk for major cardiac complications    APPROVAL GIVEN to proceed with proposed procedure, without further diagnostic evaluation        Functional Status: Independent  Patient plans to recover at home with family.     Subjective:      Joan E Mucha is a 69 y.o. female who presents for a preoperative consultation.  She is planning to have left achilles tendon repair.  She has had ongoing pain of the tendon.    All other systems reviewed and are negative, other than those listed in the HPI.    Had history of ITP in summer 2018, though over the last year has had stable CBCs.  She denies any abnormal bleeding or bruising.    Pertinent History  Do you have difficulty breathing or chest pain after walking up a flight of stairs: No  History of obstructive sleep apnea: No  Steroid use in the last 6 months: No  Frequent Aspirin/NSAID use: No  Prior Blood Transfusion: No  Prior Blood Transfusion Reaction: No  If for some reason prior to, during or after the procedure, if it is medically indicated, would you be willing to have a blood transfusion?:  There  is no transfusion refusal.    Current Outpatient Medications   Medication Sig Dispense Refill     calcium-vitamin D (CALCIUM-VITAMIN D) 500 mg(1,250mg) -200 unit per tablet Take 1 tablet by mouth 2 (two) times a day.       doxylamine (UNISOM) 25 mg tablet Take 25 mg by mouth at bedtime as needed for sleep (1/2 tablet prn).       HEMP OIL OR EXTRACT OR OTHER CBD CANNABINOID, NOT MEDICAL CANNABIS,        omeprazole (PRILOSEC) 20 MG capsule Take 20 mg by mouth daily before breakfast.       traZODone (DESYREL) 50 MG tablet Take 0.5-3 tablets ( mg total) by mouth at bedtime as needed for sleep. 60 tablet 1     No current facility-administered medications for this visit.         Allergies   Allergen Reactions     Cefuroxime Axetil      Syncope       Sulfa (Sulfonamide Antibiotics) Hives       Patient Active Problem List   Diagnosis     Arthritis     Headache     Allergies     Obesity     Vertigo     Lump Or Mass In Breast     Obesity, Class II, BMI 35-39.9     Helicobacter pylori (H. pylori) infection       Past Medical History:   Diagnosis Date     Acute ITP (H)      History of ITP        Past Surgical History:   Procedure Laterality Date     APPENDECTOMY        SECTION       FOOT SURGERY Bilateral     tenex removal     NOSE SURGERY      reconstruction       Social History     Socioeconomic History     Marital status:      Spouse name: Not on file     Number of children: Not on file     Years of education: Not on file     Highest education level: Not on file   Occupational History     Not on file   Social Needs     Financial resource strain: Not on file     Food insecurity:     Worry: Not on file     Inability: Not on file     Transportation needs:     Medical: Not on file     Non-medical: Not on file   Tobacco Use     Smoking status: Never Smoker     Smokeless tobacco: Never Used   Substance and Sexual Activity     Alcohol use: Yes     Comment: rarely     Drug use: No     Sexual activity: Never      "Partners: Male   Lifestyle     Physical activity:     Days per week: Not on file     Minutes per session: Not on file     Stress: Not on file   Relationships     Social connections:     Talks on phone: Not on file     Gets together: Not on file     Attends [employer] service: Not on file     Active member of club or organization: Not on file     Attends meetings of clubs or organizations: Not on file     Relationship status: Not on file     Intimate partner violence:     Fear of current or ex partner: Not on file     Emotionally abused: Not on file     Physically abused: Not on file     Forced sexual activity: Not on file   Other Topics Concern     Not on file   Social History Narrative    Retired.  Worked at [employer] previously.         Patient Care Team:  Lacie Snell MD as PCP - General (Family Medicine)  Lacie Snell MD as Assigned PCP          Objective:     Vitals:    12/16/19 1425   BP: 122/76   Pulse: 76   Resp: 18   Weight: 218 lb 12.8 oz (99.2 kg)   Height: 5' 3\" (1.6 m)         Physical Exam:  General Appearance: Alert, cooperative, no distress, appears stated age  Head: Normocephalic, without obvious abnormality, atraumatic  Eyes: PERRL, conjunctiva/corneas clear, EOM's intact  Ears: Normal TM's and external ear canals, both ears  Throat: Lips, mucosa, and tongue normal; teeth and gums normal  Neck: Supple, symmetrical, trachea midline, no adenopathy;  thyroid: not enlarged, symmetric, no tenderness/mass/nodules;   Back: Symmetric, no curvature, ROM normal, no CVA tenderness  Lungs: Clear to auscultation bilaterally, respirations unlabored  Heart: Regular rate and rhythm, no murmur.   Abdomen: Soft, non-tender, bowel sounds active all four quadrants,  no masses, no organomegaly  Pelvic:Not examined  Extremities: Extremities normal, atraumatic, no cyanosis or edema  Skin: Skin color, texture, turgor normal, no rashes or lesions  Lymph nodes: Cervical, supraclavicular, and axillary nodes " normal  Neurologic: Alert.   Psych: Normal affect.  Does not appear anxious or depressed.        Patient Instructions   No ASA or NSAIDs within 7 days of surgery.            Labs:  Recent Results (from the past 120 hour(s))   HM2(CBC w/o Differential)    Collection Time: 12/16/19  3:02 PM   Result Value Ref Range    WBC 6.3 4.0 - 11.0 thou/uL    RBC 4.56 3.80 - 5.40 mill/uL    Hemoglobin 13.7 12.0 - 16.0 g/dL    Hematocrit 41.8 35.0 - 47.0 %    MCV 92 80 - 100 fL    MCH 30.0 27.0 - 34.0 pg    MCHC 32.8 32.0 - 36.0 g/dL    RDW 13.5 11.0 - 14.5 %    Platelets 200 140 - 440 thou/uL    MPV 11.6 8.5 - 12.5 fL   Vitamin D, Total (25-Hydroxy)    Collection Time: 12/16/19  3:02 PM   Result Value Ref Range    Vitamin D, Total (25-Hydroxy) 39.5 30.0 - 80.0 ng/mL       Immunization History   Administered Date(s) Administered     Influenza,seasonal quad, PF, =/> 6months 12/16/2019     Tdap 01/27/2014           Electronically signed by Lacie Snell MD 12/17/19 2:27 PM

## 2021-06-06 NOTE — TELEPHONE ENCOUNTER
Who is calling:  Joan Mucha   Reason for Call:  Scheduled labs today at 2:30 pm. Patient has orders from orthopedic surgeon.   Date of last appointment with primary care: 12/16/19  Okay to leave a detailed message: Yes

## 2021-06-12 NOTE — PROGRESS NOTES
[facility] Note  Patient Name: Joan E Mucha  Patient Age: 67 y.o.  YOB: 1950  MRN: [id number]  ?  Date of Visit: 8/10/2017  Reason for Office Visit:   Chief Complaint   Patient presents with     Follow-up     Shingles      HPI: Joan E Mucha 67 y.o. who presents to clinic for follow up for zoster and herpes neuritis involving her face. She was seen on 8/3 with intense neuropathy and pain extending around left side of face from shingles. She did go an ophthalmologist when diagnosed and did not have any ocular involvement. I started her on a prednisone taper and gabapentin. She says within a few days of starting the prednisone the pain improved significantly. Has only been taking 1 gabapentin prn per day. 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 Its dead. Has wax in left ear would like flushed out too     Review of Systems: As noted in HPI     Current Scheduled Meds:  Outpatient Encounter Prescriptions as of 8/10/2017   Medication Sig Dispense Refill     gabapentin (NEURONTIN) 100 MG capsule Take 100 mg by mouth 3 (three) times a day. 90 capsule 0     predniSONE (DELTASONE) 10 mg tablet Take 4 tabs daily x 3 days, then 3 tabs daily x 3 days, then 2 tabs daily x 3 days, then 1 tab daily x 3 days 30 tablet 0     ketotifen (ZADITOR/ZYRTEC ITCHY EYES) 0.025 % (0.035 %) ophthalmic solution 1 drop to affected eye every 8 hours as needed for itching or irritation - Max of 2 doses in 24 hours 5 mL 0     No facility-administered encounter medications on file as of 8/10/2017.        Objective / Physical Examination:  /72  Pulse (!) 56  Wt (!) 226 lb 9.6 oz (102.8 kg)  SpO2 97%  Wt Readings from Last 3 Encounters:   08/10/17 (!) 226 lb 9.6 oz (102.8 kg)   08/03/17 (!) 227 lb (103 kg)   08/02/17 (!) 229 lb 8 oz (104.1 kg)     There is no height or weight on file to calculate BMI. (>25?)    General Appearance: Alert and oriented in no acute  distress  Head: Normocephalic, atraumatic  Ears: right ear has a half of a dead bee near the TM, and left ear full of wax. After irrigation cleared, tympanic membrane clear with landmarks well visualized bilaterally  Integumentary: crusted over lesion on chin and proximal to corner of mouth left side. No new vesicles  Neuro: Alert and oriented, follows commands appropriately.     Assessment / Plan / Medical Decision Making:      Encounter Diagnoses   Name Primary?     Acute herpes zoster neuropathy Yes     Impacted cerumen of left ear         1. Acute herpes zoster neuropathy  Neuropathy significantly improved with prednisone taper. Only takes gabapentin once a day as needed. Lesions crusting over, no new ones.   Will finish out course of prednisone and have bennett on hand if needed    2. Impacted cerumen of left ear  Left ear flushed with removal, cleared. Right ear had half of a dead bee that was flushed out as well.     Follow up to establish care in 3-6 months or sooner if needed    Total time spent with patient was 15 minutes with >50% of time spent in face-to-face counseling regarding the above plan     Stanley Barragan MD  [facility]

## 2021-06-12 NOTE — PROGRESS NOTES
Subjective:   Joan E Mucha is a 67 y.o. female  Roomed by: Phil BROUSSARD    Refills needed? No    Do you have any forms that need to be filled out? No      Chief Complaint   Patient presents with     Headache     headache since morning of 7/29. Top of head to under chin on the right side. Denies photophobia and sound sensativity. Took Vicodin which is helping some.   Patient states that a couple days ago she started noticing some blisters on her left lower lip and then a couple more on her left chin which she unroofed by rubbing with a washcloth.  She states that the headache is only on the left side and comes in stabs of pain. States that the headache that she has is normally at a 2 out of 10 but then she gets stabs of pain that make the headache go up to a 7 out of 10.  States that the headache has continued over the last 48 hours but felt a let up after taking some Vicodin.  She denies any blisters around her eyes. Denies any recent fevers, chills, CP or SOB. Denies any recent blurred or decreased vision. Admits some nausea, but no vomiting. Says currently feels queasy, but gets queasy from tylenol, and recently took some of her 's Vicodin.  Patient denies any decreased strengths or decrease in sensation of her hands.  States that she has not had any increase in difficulty walking.     Review of Systems  Const - Neuro - see HPI  Allergies   Allergen Reactions     Cefuroxime Axetil      Syncope       Sulfa (Sulfonamide Antibiotics) Hives       Current Outpatient Prescriptions:      HYDROcodone-acetaminophen 5-325 mg per tablet, Take 1 tablet by mouth every 4 (four) hours as needed for pain., Disp: , Rfl:      ketotifen (ZADITOR/ZYRTEC ITCHY EYES) 0.025 % (0.035 %) ophthalmic solution, 1 drop to affected eye every 8 hours as needed for itching or irritation - Max of 2 doses in 24 hours, Disp: 5 mL, Rfl: 0  Patient Active Problem List   Diagnosis     Arthritis     Headache     Allergies     Obesity      Vertigo     Lump Or Mass In Breast     Medical History Reviewed  Objective:     Vitals:    07/31/17 1830   BP: 124/86   Patient Site: Left Arm   Patient Position: Sitting   Cuff Size: Adult Large   Pulse: (!) 59   Temp: 98.1  F (36.7  C)   TempSrc: Oral   SpO2: 97%   Weight: (!) 232 lb 3.2 oz (105.3 kg)   Gen - Pt in NAD  Head - NC/AT  Skin - about two 2 mm in diameter slightly erythematous dried lesions on left lower lip and 2 more on left chin area   Eyes - PERRL, EOMI, Conjunctiva clear  Neuro: Oriented x 3, CN - 2-12 intact, Sensory - neg drift, Strengths - 5/5 UE/LE = , DTRs =, Coord - intact CARLOS  Intact tandem gait, negative Rhomberg     Assessment - Plan   Medical Decision Making -67-year-old woman presenting with some dried blisters on the left side of her face and a left-sided headache.  Patient's neuro exam was entirely normal.  Patient said she felt a little bit improved after the ketorolac but still requested some Vicodin to be able to sleep.  Discussed that the diagnosis of shingles was a presumptive diagnosis based on her presentation and her physical exam.  Her presentation and symptoms were not consistent with a migraine headache. Discussed the patient that she had come in within 48 hours of the beginning of her symptoms and therefore treatment may help her symptoms.  Recommended that if she was concerned about his symptoms before 7 days that she should either call the clinic number or be evaluated in 1 of the [facility] emergency departments.  Discussed with patient the distribution of the the C2 dermatome and that if she starts getting blisters around her left eye that she needs to call the clinic number to be directed to an ophthalmologist for more immediate evaluation..    1. Shingles  - valACYclovir (VALTREX) 1000 MG tablet; Take 1 tablet (1,000 mg total) by mouth 3 (three) times a day for 7 days. For shingles  Dispense: 21 tablet; Refill: 0    2. Left-sided headache  - ketorolac injection 30 mg  "(TORADOL); Inject 1 mL (30 mg total) into the shoulder, thigh, or buttocks once.  - HYDROcodone-acetaminophen 5-325 mg per tablet; Take 1-2 tablets by mouth 2 (two) times a day as needed for pain.  Dispense: 8 tablet; Refill: 0    At the conclusion of the encounter, assessment and plan were discussed.   All questions were answered.   The patient or guardian acknowledged understanding and was involved in the decision making regarding the overall care plan.    Patient Instructions   1. If your symptoms are not improved completing the antiviral medication, follow up sooner at your primary clinic  2. Starting tomorrow may take generic aleve( naprosyn) 220 mg - 1 tab 3 times a day as needed for pain  3. Call clinic number with any questions - answered 24/7    What is shingles? -- Shingles is a painful rash that is shaped like a band or a belt. Shingles can affect people of all ages, but it is most common in those older than 50. Another name for shingles is \"herpes zoster.\"   What causes shingles? -- Shingles is caused by the same virus that causes chickenpox. After someone has chickenpox, the virus sometimes hides out, \"asleep\" in the body. Years later, it can \"wake up\" and cause shingles. The first time a person is infected with that virus, he or she gets chickenpox, not shingles.  Is shingles contagious? -- Yes and no. It is NOT possible to \"catch\" shingles from someone who has the rash. But it is possible to \"catch\" the virus and then get sick with chickenpox. Shingles and chickenpox are caused by the same virus.   You probably will NOT catch the virus (or get chickenpox) if you:   ?Had chickenpox or shingles in the past  ?Had the chickenpox vaccine  ?Were born before 1980 (most people born before 1980 have had chickenpox even if they don t remember it)   If you have never had chickenpox or the chickenpox vaccine, be careful around anyone with shingles. Do not touch their rash. If you do, you could get sick with " "chickenpox. In rare cases, people can even get chickenpox from just being near someone with shingles. This is most likely in people who cannot fight infections well.   What are the symptoms of shingles? -- At first, shingles causes weird sensations on your skin. You might feel itching, burning, pain or tingling. Some people get a fever, feel sick, or get a headache. Within 1 to 2 days, a rash with blisters appears. Blisters most often appear in a band across the chest and back. They can show up on other parts of the body, too. The blisters cause pain that can be mild or severe.  Within 3 to 4 days, shingles blisters can become open sores or \"ulcers\". These ulcers can get infected. Within 7 to 10 days, the rash should scab over. By then, most people are no longer contagious.  Can shingles be serious? -- Yes. Shingles can be serious, but that is rare. About 1 out of 10 people with shingles will get something called \"postherpetic neuralgia,\" or \"PHN.\" People with PHN keep feeling pain or discomfort even after their rash goes away. This pain can last for months or even years. It can be so bad that it makes it hard to sleep, causes weight loss, and leads to depression.  Shingles can also cause:  ?Skin infections  ?Eye problems (if the rash is near the eye)   ?Ear problems (if the rash is near the ear)  ?Dangerous infections in people who have other health problems  Should I be treated? -- If you see your doctor or nurse within 3 days of when your symptoms start, yes. He or she should give you medicines to help you get rid of the virus. These medicines are called anti-virals. They can speed your recovery and reduce the chances that you will have shingles-related problems such as PHN.  Can the pain be treated? -- Some people can deal with their pain with non-prescription pain medicines, but most people need prescription medicines.   How should I take care of the rash? -- Keep the parts of your skin that have a rash clean " and dry. Do not use creams or gels unless your doctor or nurse says you should.   Can shingles be prevented? -- People can reduce the chances of getting shingles by having the shingles vaccine. The vaccine can also make the symptoms of shingles milder if they do occur. Most people age 60 and older should get the shingles vaccine.

## 2021-06-12 NOTE — PROGRESS NOTES
"ASSESSMENT:   1. Shingles  Ambulatory referral to Ophthalmology    ondansetron (ZOFRAN, AS HYDROCHLORIDE,) 4 MG tablet    traMADol (ULTRAM) 50 mg tablet    DISCONTINUED: HYDROcodone-acetaminophen 5-325 mg per tablet   2. Left-sided headache  DISCONTINUED: HYDROcodone-acetaminophen 5-325 mg per tablet   3. Nausea          PLAN:  Shingles is now progressing to around the eye, thus recommend she has evaluation with ophthalmology. She was scheduled to see [facility] at 3:45 this afternoon.    She has been fairly nauseated, which I suspect is in part due to the narcotic pain medication.  Recommend stopping Norco for the time being.  Trial tramadol 1 tablet every 6 hours prn pain.  Cautioned on sedating effects and advised no driving for 6 hrs after taking.  Prescribed ondansetron for her nausea.  She is following up with primary care tomorrow and will discuss alternations to treatment plan at that time depending on her pain/nausea response to a different medication.     SUBJECTIVE:   Joan E Mucha is a 67 y.o. female presents today with shingles. She developed blisters on the left side of her lip and chin 5 days ago.  She was seen in the clinic 7/31/17 - dx with shingles and started on valacyclovir.  She was told if she developed blisters around the eye, she should call the clinic to be directed to an ophthalmologist for evaluation. Her  noted a few blisters starting around her left eye and on the left hair line. She states she \"can't focus on anything\" in terms of her vision, which she believes is due to the narcotic pain medication, but not specific diplopia, blurry vision, eye pain, or loss of visual fields. She also reports a left sided headache and nausea.  She reports feeling nauseated - which she attributes to the tylenol in the Norco, as she frequently gets nausea from tylenol or ibuprofen.   She is taking Norco: 2 tablets q6 hours which is somewhat helpful, but she is out of pain medication at this time. " She started the valacyclovir yesterday morning.       Denies fever, chills.  Denies eye pain or drainage.  Denies eye irritation. Denies vision changes. Denies hearing changes.     Patient Active Problem List   Diagnosis     Arthritis     Headache     Allergies     Obesity     Vertigo     Lump Or Mass In Breast       History   Smoking Status     Never Smoker   Smokeless Tobacco     Never Used       Current Medications:  Current Outpatient Prescriptions on File Prior to Visit   Medication Sig Dispense Refill     valACYclovir (VALTREX) 1000 MG tablet Take 1 tablet (1,000 mg total) by mouth 3 (three) times a day for 7 days. For shingles 21 tablet 0     HYDROcodone-acetaminophen 5-325 mg per tablet Take 1-2 tablets by mouth 2 (two) times a day as needed for pain. 8 tablet 0     ketotifen (ZADITOR/ZYRTEC ITCHY EYES) 0.025 % (0.035 %) ophthalmic solution 1 drop to affected eye every 8 hours as needed for itching or irritation - Max of 2 doses in 24 hours 5 mL 0     No current facility-administered medications on file prior to visit.        Allergies:   Allergies   Allergen Reactions     Cefuroxime Axetil      Syncope       Sulfa (Sulfonamide Antibiotics) Hives       OBJECTIVE:   Vitals:    08/02/17 1327 08/02/17 1330   BP: 128/78    Pulse: (!) 58 60   Resp: 12    Temp: 98.4  F (36.9  C)    TempSrc: Oral    SpO2: 96%    Weight: (!) 229 lb 8 oz (104.1 kg)      Physical exam reveals a 67 y.o. female.   Appears healthy, alert.  In NAD. She appears to be in pain.  Eyes:  Pupils are small bilaterally but react to light.  Equal bilaterally. EOMS intact.  No conjunctivitis. Lids are without erythema or edema.    Ears:  Left external ear canal has a grouping of vesicles distally at the entrance of the canal.  The L TM is gray, pearly.  Right external ear canal is clear.  R TM is gray, pearly.  Nose:    Mucosa normal.   Mouth:  Mucosa pink and moist. She has a grouping of 5-7 vesicles on her left lower lip mucosa and the buccal  mucosa on the left.  No pharyngeal erythema or vesicles. Uvula is midline.   Lymph:few small, tender posterior cervical LAD on the left.   Lungs: Chest is clear, no wheezing, rhonchi or rales. Symmetric air entry throughout both lung fields.  Heart: regular rate and rhythm, no murmur, rub or gallop  Skin: She has groupings of vesicles, with erythematous bases, around the left side of her mouth, left side of her chin, left temporal region, and inferolateral to the left eye.  There are no nasal lesions.

## 2021-06-12 NOTE — PROGRESS NOTES
"Assessment / Impression     1. Acute herpes zoster neuropathy     2. Screening for diabetes mellitus  Glycosylated Hemoglobin A1c   3. Fatigue, unspecified type  HM2(CBC w/o Differential)    Vitamin D, Total (25-Hydroxy)    Thyroid Cascade   4. Morbid obesity with BMI of 40.0-44.9, adult     5. Screening for colon cancer  Occult Blood, Fecal   6. Visit for screening mammogram  Mammo Screening Bilateral       The following high BMI interventions were performed this visit: encouragement to exercise, dietary management education, guidance, and counseling and lifestyle education regarding diet, discussed trying to decrease carbohydrates in diet    Plan:     Zoster: Symptoms have improved significantly.  I discussed other possible symptomatic treatment options for her neuropathic pain such as tricyclic antidepressants, however patient really wanted a medication she could take as needed, will stick with gabapentin for now.  Follow-up if symptoms are persisting over the next month.    Diabetes screening: I did have patient complete BERNADETTE to get her prior records from her optometrist.  I am not sure what she means by \"popping vessels\".  Will go ahead and check hemoglobin A1c, per patient request, however due when a review of records, it is possible she may need carotid ultrasound as well.    Fatigue: Again, per patient request, will check vitamin D level.  Because she is noted to have continued fatigue, will also check CBC as well as thyroid level today.    Morbid obesity: Discussed with patient weight management, as patient has noted she has gained 55 pounds over the last 5 years.  Discussed limiting caloric as well as carbohydrate intake.    Counseled patient regarding colon cancer screening as well as mammogram use.  Patient would like to do Hemoccult cards, this was given to her at clinic today.  Order for screening mammogram also ordered today.    Subjective:      HPI: Joan E Mucha is a 67 y.o. female, new to me, who " "presents for follow-up of multiple concerns, including recent diagnosis of acute herpes zoster neuropathy of the left face, abnormal findings at recent eye exam at optometrist office, as well as ongoing fatigue.    Briefly, she was seen in walk-in clinic initially on 8/2, then again on 8/3 and 8/10 for herpes zoster, initially treated with prednisone and gabapentin, and today she is on her last of her taper dose of prednisone.  She has noted significant improvement on her left facial symptoms, though still having some occasional tingling and postherpetic neuralgia symptoms.  She is taking gabapentin as needed, however feels quite lightheaded and feels she is developing nystagmus when taking the gabapentin.    Patient is also requesting hemoglobin A1c to be checked today.  She has been seeing an optometrist, Dr. Dhillon, at [facility], and was told that she had \"popping eye vessels\" and was told to get her hemoglobin A1c checked.  Denies polydipsia or polyuria.    When she was seen by her previous PCP, she was advised to get her vitamin D level checked.  She feels somewhat fatigued.      Medical History:     Patient Active Problem List   Diagnosis     Arthritis     Headache     Allergies     Obesity     Vertigo     Lump Or Mass In Breast     Morbid obesity with BMI of 40.0-44.9, adult       No past medical history on file.    Current Medications:     Current Outpatient Prescriptions   Medication Sig     gabapentin (NEURONTIN) 100 MG capsule Take 100 mg by mouth 3 (three) times a day.       Family History:   No family history on file.    Review of Systems  All other systems reviewed and are negative.         Social History:     History   Smoking Status     Never Smoker   Smokeless Tobacco     Never Used           Objective:     /82 (Patient Site: Right Arm, Patient Position: Sitting, Cuff Size: Adult Large)  Pulse 62  Resp 20  Ht 5' 2.75\" (1.594 m)  Wt (!) 226 lb 3.2 oz (102.6 kg)  SpO2 98%  BMI " 40.39 kg/m2  Physical Examination: General appearance - alert, well appearing, and in no distress  Eyes: pupils equal and reactive, extraocular eye movements intact. No rash or vesicles noted near eye.  Ears: normal external ears, clear canals,  TMs appear normal, with no redness or bulging noted.  Mouth: mucous membranes moist, pharynx normal without lesions  Neck: supple, no significant adenopathy or thyromegaly  Lungs: clear to auscultation, no wheezes, rales or rhonchi, symmetric air entry  Heart: normal rate, regular rhythm, normal S1, S2, no murmurs.  Abdomen: soft, nontender, nondistended, no masses or organomegaly  Neurological: alert, oriented, normal speech, no focal findings or movement disorder noted.    Extremities: No edema, no clubbing or cyanosis  Psychiatric: Normal affect. Does not appear anxious or depressed.    Recent Results (from the past 168 hour(s))   HM2(CBC w/o Differential)   Result Value Ref Range    WBC 11.3 (H) 4.0 - 11.0 thou/uL    RBC 4.93 3.80 - 5.40 mill/uL    Hemoglobin 15.3 12.0 - 16.0 g/dL    Hematocrit 46.4 35.0 - 47.0 %    MCV 94 80 - 100 fL    MCH 31.1 27.0 - 34.0 pg    MCHC 33.0 32.0 - 36.0 g/dL    RDW 11.6 11.0 - 14.5 %    Platelets 171 140 - 440 thou/uL    MPV 9.3 7.0 - 10.0 fL   Glycosylated Hemoglobin A1c   Result Value Ref Range    Hemoglobin A1c 5.6 3.5 - 6.0 %         Lacie Snell MD  8/15/2017  2:52 PM

## 2021-06-12 NOTE — PROGRESS NOTES
[facility] Clinic Note  Patient Name: Joan E Mucha  Patient Age: 67 y.o.  YOB: 1950  MRN: [id number]  ?  Date of Visit: 8/3/2017  Reason for Office Visit:   Chief Complaint   Patient presents with     Follow-up     shingles, unable to control the pain     HPI: Joan E Mucha 67 y.o. who presents to clinic for follow up for shingles on face. Started around 7/28 with headache and a few blisters and then went to [facility] on 7/31 and started on valcyclovir. She then went to  yesterday after rash was spreading to eye, and sent to ophthalmology, per patient they were not concerned for ocular involvement. She is sensitive to tylenol and ibuprofen, with associated nausea. She has been taking tramadol but does not give much relief. Shooting pains coming from posterior left side of head. No diplopia, blurry vision, loss of vision.     Review of Systems: As noted in HPI     Current Scheduled Meds:  Outpatient Encounter Prescriptions as of 8/3/2017   Medication Sig Dispense Refill     ketotifen (ZADITOR/ZYRTEC ITCHY EYES) 0.025 % (0.035 %) ophthalmic solution 1 drop to affected eye every 8 hours as needed for itching or irritation - Max of 2 doses in 24 hours 5 mL 0     ondansetron (ZOFRAN, AS HYDROCHLORIDE,) 4 MG tablet Take 1 tablet (4 mg total) by mouth every 8 (eight) hours as needed for nausea. 5 tablet 0     valACYclovir (VALTREX) 1000 MG tablet Take 1 tablet (1,000 mg total) by mouth 3 (three) times a day for 7 days. For shingles 21 tablet 0     gabapentin (NEURONTIN) 100 MG capsule Take 100 mg by mouth 3 (three) times a day. 90 capsule 0     predniSONE (DELTASONE) 10 mg tablet Take 4 tabs daily x 3 days, then 3 tabs daily x 3 days, then 2 tabs daily x 3 days, then 1 tab daily x 3 days 30 tablet 0     traMADol (ULTRAM) 50 mg tablet Take 1 tablet (50 mg total) by mouth every 6 (six) hours as needed for pain. 8 tablet 0     No facility-administered encounter medications on file as of 8/3/2017.         Objective / Physical Examination:  /68  Pulse 84  Wt (!) 227 lb (103 kg)  Wt Readings from Last 3 Encounters:   08/03/17 (!) 227 lb (103 kg)   08/02/17 (!) 229 lb 8 oz (104.1 kg)   07/31/17 (!) 232 lb 3.2 oz (105.3 kg)     There is no height or weight on file to calculate BMI. (>25?)    General Appearance: Alert and oriented in no acute distress  Ears: Tympanic membrane clear with landmarks well visualized bilaterally  Eyes: Conjunctivae clear   Integumentary: scattered vesicular rash over chin, left cheek, upper lip. Mild proximal erythema, no purulent discharge a mild swelling     Assessment / Plan / Medical Decision Making:      Encounter Diagnoses   Name Primary?     Herpes zoster with other complication Yes        1. Herpes zoster with other complication  With severe herpetic neuritis   No ocular involvement per ophthalmology  No signs of superimposed bacterial infection, but advised to watch for signs of infection, redness, warmth, pus, etc.   - predniSONE (DELTASONE) 10 mg tablet; Take 4 tabs daily x 3 days, then 3 tabs daily x 3 days, then 2 tabs daily x 3 days, then 1 tab daily x 3 days  Dispense: 30 tablet; Refill: 0  - gabapentin (NEURONTIN) 100 MG capsule; Take 100 mg by mouth 3 (three) times a day.  Dispense: 90 capsule; Refill: 0    Follow up in 1 week to see response to steroids and gabapentin     Total time spent with patient was 15 minutes with >50% of time spent in face-to-face counseling regarding the above plan     Stanley Barragan MD  [facility]

## 2021-06-16 PROBLEM — A04.8 HELICOBACTER PYLORI (H. PYLORI) INFECTION: Status: ACTIVE | Noted: 2018-07-25

## 2021-06-16 PROBLEM — E66.812 OBESITY, CLASS II, BMI 35-39.9: Status: ACTIVE | Noted: 2018-07-10

## 2021-06-17 NOTE — TELEPHONE ENCOUNTER
Left detailed message for patient with Dr. Snell's message below. Pt to call back with any further questions or concerns.

## 2021-06-17 NOTE — PROGRESS NOTES
Joan E Mucha is a 70 y.o. female who is being evaluated via a billable video visit.      How would you like to obtain your AVS? MyChart.  If dropped from the video visit, the video invitation should be resent by: Text to cell phone: [phone]  Will anyone else be joining your video visit? No    Video Start Time: 3:22pm    Assessment & Plan     Suspected 2019 novel coronavirus infection  Given patient has known exposure to her daughter-in-law who tested positive for COVID-19 as well as symptoms as noted below, patient likely has COVID-19.  I did offer her COVID-19 test, though she declined.  At this time she does feel that her breathing is gradually improving.  Therefore, I did check , does not show any recent prescriptions for guaifenesin with codeine, so I gave her a one-time prescription to use as needed.  Discussed with patient that if her symptoms get worse, especially her shortness of breath, she needs to go to the ED immediately.  Patient understands, agrees with plan.  - codeine-guaiFENesin (GUAIFENESIN AC)  mg/5 mL liquid; Take 5 mL by mouth 2 (two) times a day as needed for cough.             Return in about 1 month (around 5/28/2021) for Medicare Annual Wellness Visit.    Lacie Snell MD  [facility]   Joan E Mucha is 70 y.o. and presents today for the following health issues   HPI   Patient has had cough symptoms since April 17, 2021.  Briefly, they watch their grandchild 1 day a week, and often have dinner with her son and daughter-in-law.  They had dinner with them on April 15, and her daughter-in-law tested positive for COVID-19 on April 16.  Patient symptoms started on April 17, including headache, loss of sense of smell, and myalgias.  She did not check her temperature, though she did feel warm.  She did have some slight difficulty breathing, though that has gradually improved.  She has not tested for COVID-19, she declines COVID-19 vaccine.   She had previously had an old prescription of guaifenesin with codeine that she received in 2019, though it is .  She did take the cough syrup the last 3 nights which has helped her sleep at night significantly.  She is wondering if she can get refill of this medication.          Review of Systems  See above      Objective       Vitals:  No vitals were obtained today due to virtual visit.    Physical Exam  GENERAL: Healthy, alert and no distress  EYES: Eyes grossly normal to inspection. No discharge or erythema, or obvious scleral/conjunctival abnormalities.  RESP: No audible wheeze, cough, or visible cyanosis.  No visible retractions or increased work of breathing.    NEURO: Cranial nerves grossly intact. Mentation and speech appropriate for age.  PSYCH: Mentation appears normal, affect normal/bright, judgement and insight intact, normal speech and appearance well-groomed              Video-Visit Details    Type of service:  Video Visit    Video End Time (time video stopped): 3:34pm  Originating Location (pt. Location): Home    Distant Location (provider location):  [facility]     Platform used for Video Visit: Js

## 2021-06-17 NOTE — TELEPHONE ENCOUNTER
Please let pt know this is an antibiotic, not appropriate for me to prescribe without an OV.  Pt should also consider getting tested for COVID-19, and if concerned about cough, go to [facility] today.

## 2021-06-17 NOTE — TELEPHONE ENCOUNTER
Reason for Call:  Medication Refill     Do you use a [facility]?  Name of the pharmacy and phone number for the current request: [facility] #[id number] - [place], [place] - [address]    Name of the medication requested: doxycycline (VIBRA-TABS) 100 MG tablet    Other request: Patient called stating she needs a refill on this rx. Patient's family and daughter in law's parents were positive for covid a couple weeks ago. Patient and her  did not get tested but it has been about two weeks and she still has this lingering cough. Patient would like a call back if provider can prescribe this rx.     Can we leave a detailed message on this number? Yes    Phone number patient can be reached at:   Cell number on file:    Telephone Information:   Mobile [phone]       Best Time: Any time    Call taken on 4/28/2021 at 1:19 PM by Anil Lindsey

## 2021-06-19 NOTE — PROGRESS NOTES
[facility] Progress Note    Patient: Joan E Mucha  MRN: [id number]  Date of Service: 7/25/2018        Reason for visit      1. ITP secondary to infection    2. Easy bruising    3. Petechiae of palate    4. Petechiae or ecchymoses    5. Helicobacter pylori (H. pylori) infection        Assessment     1.  ITP  2.  Most likely the ITP secondary to the H. pylori infection.  3.  Significant intolerance to dexamethasone.  4.  Still platelet count is not completely normal.  5.  Pending surgery to repair the right Achilles tendon.    Plan     1.  Start her on clarithromycin 500 mg twice a day for 14 days and Flagyl 500 mg 3 times a day for 14 days.  This is to treat H pylori.  I expect that her platelet count will start getting better once H pylori is treated.  2.  Follow-up in 2 weeks with another hemogram.  3.  Advised to call if she notes any bleeding or ecchymosis.    Clinical stage      N/A.    History     Joan E Mucha is a very pleasant 67 y.o. old female with a history of idiopathic immune thrombocytopenia diagnosed in July 6, 2018 when she presented with a platelet count of 1000 along with some submucosal bleeding and ecchymosis on her skin.  She was treated with Winrho but only half a dose.  She is also put on dexamethasone pulsing.  She has done 3 dexamethasone pulsing of 4 days on 4 days off.  She did not tolerate very well.  In fact the last 4 days of dexamethasone she only took 2 days of it.    She did also have stool testing done.  She is positive for H pylori infection.  She had significant side effects from dexamethasone the form of anxiety and feeling very nervous.  She also gained some weight.    She also has injury to her right Achilles tendon.  That needs surgical repair according to her.    Past Medical History     Past Medical History:   Diagnosis Date     History of ITP            Review of Systems   Constitutional  Constitutional (WDL): Exceptions to WDL  Weight Gain: 5 - <10% from  baseline (up 12 lbs suddenly on prednisone)  Neurosensory  Neurosensory (WDL): Exceptions to WDL  Cardiovascular  Cardiovascular (WDL): All cardiovascular elements are within defined limits  Pulmonary  Respiratory (WDL): Within Defined Limits  Gastrointestinal  Gastrointestinal (WDL): Exceptions to WDL  Dysphagia: Symptomatic, able to eat regular diet (pills)  Dry Mouth: Symptomatic (e.g., dry or thick saliva) without significant dietary alteration, unstimulated saliva flow >0.2 ml/min  Genitourinary  Genitourinary (WDL): All genitourinary elements are within defined limits  Integumentary  Integumentary (WDL): Exceptions to WDL (easily bruises)  Patient Coping  Patient Coping: Accepting  Accompanied by  Accompanied by: Alone    ECOG performance status and Distress Assessment      ECOG Performance:    ECOG Performance Status: 0    Distress Assessment  Distress Assessment Score: 7 (being on steroids):     Pain Status  Currently in Pain: No/denies        Vital Signs     Vitals:    07/25/18 1529   BP: 138/73   Pulse: 66   Temp: 98.1  F (36.7  C)   SpO2: 98%       Physical Exam     GENERAL: No acute distress. Cooperative in conversation.   HEENT: Pupils are equal, round and reactive. Oral mucosa is clean and intact. No ulcerations or mucositis noted. No bleeding noted.  RESP:Chest symmetric lungs are clear bilaterally per auscultation. Regular respiratory rate. No wheezes or rhonchi.  CV: Normal S1 S2 Regular, rate and rhythm. No murmurs.  ABD: Nondistended, soft, nontender. Positive bowel sounds. No organomegaly.   EXTREMITIES: No lower extremity edema.   NEURO: Non- focal. Alert and oriented x3.  Cranial nerves appear intact.  PSYCH: Within normal limits. No depression or anxiety.  SKIN: Warm dry intact.  She still has some residual ecchymosis on her left arm.  LYMPH NODES: Bilateral cervical, supraclavicular, axillary lymph node examination was done.  Negative for any palpable adenopathy.      Lab Results     Results  for orders placed or performed in visit on 07/25/18   HM1 (CBC with Diff)   Result Value Ref Range    WBC 12.3 (H) 4.0 - 11.0 thou/uL    RBC 4.07 3.80 - 5.40 mill/uL    Hemoglobin 12.3 12.0 - 16.0 g/dL    Hematocrit 37.8 35.0 - 47.0 %    MCV 93 80 - 100 fL    MCH 30.2 27.0 - 34.0 pg    MCHC 32.5 32.0 - 36.0 g/dL    RDW 15.3 (H) 11.0 - 14.5 %    Platelets 95 (L) 140 - 440 thou/uL    MPV 11.4 8.5 - 12.5 fL    Neutrophils % 59 50 - 70 %    Lymphocytes % 30 20 - 40 %    Monocytes % 11 (H) 2 - 10 %    Eosinophils % 0 0 - 6 %    Basophils % 0 0 - 2 %    Neutrophils Absolute 6.9 2.0 - 7.7 thou/uL    Lymphocytes Absolute 3.7 0.8 - 4.4 thou/uL    Monocytes Absolute 1.3 (H) 0.0 - 0.9 thou/uL    Eosinophils Absolute 0.0 0.0 - 0.4 thou/uL    Basophils Absolute 0.0 0.0 - 0.2 thou/uL         Imaging Results     Ct Head Without Contrast    Result Date: 7/6/2018  [facility] CT HEAD WO CONTRAST 7/6/2018 1:02 PM INDICATION: Headache. New thrombocytopenia. TECHNIQUE: Serial axial images were obtained through the brain without contrast. Dose reduction techniques were used. CONTRAST: None. COMPARISON: None. FINDINGS: No finding for intracranial hemorrhage, mass, or acute infarct. Mild generalized prominence of the ventricles, sulci, and cisterns. Small focus of low-attenuation change right subinsular region compatible with an incidental cyst or dilated perivascular space. Minimal low-attenuation change compatible with nondescript gliosis is seen within the periventricular white matter. Gray-white matter differentiation otherwise preserved. No mass effect or midline shift. Calvarium is intact, without suspicious lytic or blastic foci. Visualized paranasal sinuses, middle ear cavities, and mastoid air cells are clear. Orbits are unremarkable. No scalp hematoma.     1. No finding for intracranial hemorrhage, mass, or acute infarct.    Total time spent was 40 minutes, more than half of it was in face-to-face counseling regarding  disease state, treatment, side effects and management.      Abel Landrum MD

## 2021-06-19 NOTE — PROGRESS NOTES
Assessment / Impression     1. Hospital discharge follow-up     2. Acute ITP (H)  HM1(CBC and Differential)    Comprehensive Metabolic Panel    HM1 (CBC with Diff)   3. Obesity, Class II, BMI 35-39.9           Plan:     Patient appears to be improving, which is good.  CBC as well as CMP was ordered today.  Advised patient to follow-up with hematology as planned, and I did discuss with patient again that we should wait for hematology recommendations in regard to when she can reschedule her orthopedic surgery.    In regard to patient's weight, I congratulated her on the weight loss she had since last summer.  I also informed patient that I am part of the [facility] clinic, and would be happy to help manage her weight, however it is best done while patient is enrolled in this program.  She was given contact information, may call if she decides to be enrolled in program.    Follow-up when she needs preoperative exam, sooner if needed.    Subjective:      HPI: Joan E Mucha is a 67 y.o. female who presents for hospital discharge follow-up.  Briefly, was seen by me for an office visit on Friday, June July 6, at that time, she had approximately 2 days worth of easy bruising and bleeding within her mouth, and at that visit, we had a stat CBC done which showed a platelet count of 2000.  I informed patient that she needed to go to the hospital immediately, where she was admitted, seen by hematology, and given patient's remaining CBC was normal and as well as her history, she likely had acute ITP.  She was started patient on IV dexamethasone, received Rhophylac, was discharged with recommendation of completing 3 cycles of oral dexamethasone 40 mg of Decadron for 4 days, followed by 4 days off.  She has noted some mild jitteriness with taking oral dexamethasone.  She was also instructed to continue omeprazole.    Since hospital discharge, patient states she is doing much better.  She has not noted any new bruising.   "She has been flossing her teeth, eating, has not noted any bleeding in her mouth.  No fevers, chills, sweats.      Patient also interested in weight loss.  She is hoping to have her surgery on her foot completed soon so she can start becoming more active.  She is wondering if I could prescribe her medication to help with appetite suppression while she is waiting for her surgery and soon after.      Medical History:     Patient Active Problem List   Diagnosis     Arthritis     Headache     Allergies     Obesity     Vertigo     Lump Or Mass In Breast     Thrombocytopenia (H)     Other elevated white blood cell (WBC) count     Acute ITP (H)     Obesity, Class II, BMI 35-39.9       History reviewed. No pertinent past medical history.    History reviewed. No pertinent surgical history.    Current Medications:     Current Outpatient Prescriptions   Medication Sig Note     calcium-vitamin D (CALCIUM-VITAMIN D) 500 mg(1,250mg) -200 unit per tablet Take 1 tablet by mouth 2 (two) times a day.      dexamethasone (DECADRON) 4 MG tablet Take 10 tablets (40 mg total) by mouth daily with breakfast for 9 doses.      methylPREDNISolone (MEDROL DOSEPACK) 4 mg tablet  7/10/2018: Received from: External Pharmacy     multivitamin with minerals (HAIR,SKIN AND NAILS) tablet Take 1 tablet by mouth at bedtime.      omeprazole (PRILOSEC) 20 MG capsule Take 1 capsule (20 mg total) by mouth 2 (two) times a day before meals.        Family History:   History reviewed. No pertinent family history.    Review of Systems  All other systems reviewed and are negative.         Social History:     History   Smoking Status     Never Smoker   Smokeless Tobacco     Never Used     Social History     Social History Narrative         Objective:     /68 (Patient Site: Right Arm, Patient Position: Sitting, Cuff Size: Adult Large)  Pulse 64  Resp 16  Ht 5' 3\" (1.6 m)  Wt 202 lb 11.2 oz (91.9 kg)  BMI 35.91 kg/m2  Physical Examination: General " appearance - alert, well appearing, and in no distress  Eyes: pupils equal and reactive, extraocular eye movements intact  Mouth: mucous membranes moist, pharynx normal without lesions  Neck: supple, no significant adenopathy or thyromegaly  Lungs: clear to auscultation, no wheezes, rales or rhonchi, symmetric air entry  Heart: normal rate, regular rhythm, normal S1, S2, no murmurs.  Abdomen: soft, nontender, nondistended, no masses or organomegaly  Neurological: alert, oriented, normal speech, no focal findings or movement disorder noted.    Extremities: No edema, no clubbing or cyanosis  Skin: Multiple areas of bruising noted on her upper extremities and lower extremities.  Small area of petechiae noted on her right upper extremity.  Psychiatric: Normal affect. Does not appear anxious or depressed.    Recent Results (from the past 24 hour(s))   HM1 (CBC with Diff)    Collection Time: 07/10/18 11:05 AM   Result Value Ref Range    WBC 14.4 (H) 4.0 - 11.0 thou/uL    RBC 4.31 3.80 - 5.40 mill/uL    Hemoglobin 13.3 12.0 - 16.0 g/dL    Hematocrit 38.0 35.0 - 47.0 %    MCV 88 80 - 100 fL    MCH 30.9 27.0 - 34.0 pg    MCHC 35.0 32.0 - 36.0 g/dL    RDW 14.2 11.0 - 14.5 %    Platelets 75 (L) 140 - 440 thou/uL    MPV 12.6 (H) 8.5 - 12.5 fL    Neutrophils % 75 (H) 50 - 70 %    Lymphocytes % 14 (L) 20 - 40 %    Monocytes % 11 (H) 2 - 10 %    Eosinophils % 0 0 - 6 %    Basophils % 0 0 - 2 %    Neutrophils Absolute 10.2 (H) 2.0 - 7.7 thou/uL    Lymphocytes Absolute 2.0 0.8 - 4.4 thou/uL    Monocytes Absolute 1.6 (H) 0.0 - 0.9 thou/uL    Eosinophils Absolute 0.0 0.0 - 0.4 thou/uL    Basophils Absolute 0.1 0.0 - 0.2 thou/uL           Lacie Snell MD  7/10/2018  11:44 AM

## 2021-06-19 NOTE — PROGRESS NOTES
[facility] Progress Note    Patient: Joan E Mucha  MRN: [id number]  Date of Service: 8/8/2018        Reason for visit      1. Acute ITP (H)     2. Helicobacter pylori (H. pylori) infection         Assessment     1.  ITP  2.  Most likely the ITP secondary to the H. pylori infection.  She has been treated and looks like her platelet count has normalized.  3.  Significant intolerance to dexamethasone.  4.  Still platelet count is not completely normal.  5.  Pending surgery to repair the right Achilles tendon.    Plan     1.  At this time she does not need any other therapy for her ITP.  She does need once a month platelet count checked for the next 3 months.  2.  Follow-up with Dr. Lacie Snell for her monthly platelet count check.  As far as I am concerned she can go ahead and have her Achilles tendon surgery.  She does need to see Dr. Snell for preoperative clearance.  3.  Advised to call if she notes any bleeding or ecchymosis.    Clinical stage      N/A.    History     Joan E Mucha is a very pleasant 68 y.o. old female with a history of idiopathic immune thrombocytopenia diagnosed in July 6, 2018 when she presented with a platelet count of 1000 along with some submucosal bleeding and ecchymosis on her skin.  She was treated with Winrho but only half a dose.  She is also put on dexamethasone pulsing.  She has done 3 dexamethasone pulsing of 4 days on 4 days off.  She did not tolerate very well.  In fact the last 4 days of dexamethasone she only took 2 days of it.  She had significant side effects from dexamethasone the form of anxiety and feeling very nervous.  She also gained some weight.  Her platelet counts did improve after dexamethasone 2/9/2000.    She did also have stool testing done.  She is positive for H pylori infection.  She has been treated with clarithromycin Flagyl and omeprazole.  She tolerated treatment well.    She also has injury to her right Achilles tendon.  That needs surgical repair  according to her.    Past Medical History     Past Medical History:   Diagnosis Date     History of ITP            Review of Systems   Constitutional  Constitutional (WDL): Exceptions to WDL  Fatigue: Fatigue relieved by rest  Weight Loss: to <10% from baseline, intervention not indicated (down 5 lbs)  Neurosensory  Neurosensory (WDL): Exceptions to WDL  Cardiovascular  Cardiovascular (WDL): All cardiovascular elements are within defined limits  Pulmonary  Respiratory (WDL): Within Defined Limits  Gastrointestinal  Gastrointestinal (WDL): All gastrointestinal elements are within defined limits  Genitourinary  Genitourinary (WDL): All genitourinary elements are within defined limits  Integumentary  Integumentary (WDL): All integumentary elements are within defined limits  Patient Coping  Patient Coping: Accepting  Accompanied by  Accompanied by: Alone    ECOG performance status and Distress Assessment      ECOG Performance:    ECOG Performance Status: 0    Distress Assessment  Distress Assessment Score: 1:     Pain Status  Currently in Pain: Yes        Vital Signs     Vitals:    08/08/18 1445   BP: 129/83   Pulse: 76   Temp: 98.5  F (36.9  C)   SpO2: 98%       Physical Exam     GENERAL: No acute distress. Cooperative in conversation.   HEENT: Pupils are equal, round and reactive. Oral mucosa is clean and intact. No ulcerations or mucositis noted. No bleeding noted.  RESP:Chest symmetric lungs are clear bilaterally per auscultation. Regular respiratory rate. No wheezes or rhonchi.  CV: Normal S1 S2 Regular, rate and rhythm. No murmurs.  ABD: Nondistended, soft, nontender. Positive bowel sounds. No organomegaly.   EXTREMITIES: No lower extremity edema.   NEURO: Non- focal. Alert and oriented x3.  Cranial nerves appear intact.  PSYCH: Within normal limits. No depression or anxiety.  SKIN: Warm dry intact.  She still has some residual ecchymosis on her left arm.  LYMPH NODES: Bilateral cervical, supraclavicular, axillary  lymph node examination was done.  Negative for any palpable adenopathy.      Lab Results     Results for orders placed or performed in visit on 08/08/18   Comprehensive Metabolic Panel   Result Value Ref Range    Sodium 142 136 - 145 mmol/L    Potassium 3.6 3.5 - 5.0 mmol/L    Chloride 106 98 - 107 mmol/L    CO2 27 22 - 31 mmol/L    Anion Gap, Calculation 9 5 - 18 mmol/L    Glucose 97 70 - 125 mg/dL    BUN 16 8 - 22 mg/dL    Creatinine 0.97 0.60 - 1.10 mg/dL    GFR MDRD Af Amer >60 >60 mL/min/1.73m2    GFR MDRD Non Af Amer 57 (L) >60 mL/min/1.73m2    Bilirubin, Total 1.5 (H) 0.0 - 1.0 mg/dL    Calcium 10.3 8.5 - 10.5 mg/dL    Protein, Total 6.5 6.0 - 8.0 g/dL    Albumin 3.9 3.5 - 5.0 g/dL    Alkaline Phosphatase 95 45 - 120 U/L    AST 22 0 - 40 U/L    ALT 31 0 - 45 U/L   HM1 (CBC with Diff)   Result Value Ref Range    WBC 5.4 4.0 - 11.0 thou/uL    RBC 4.47 3.80 - 5.40 mill/uL    Hemoglobin 13.6 12.0 - 16.0 g/dL    Hematocrit 41.3 35.0 - 47.0 %    MCV 92 80 - 100 fL    MCH 30.4 27.0 - 34.0 pg    MCHC 32.9 32.0 - 36.0 g/dL    RDW 15.1 (H) 11.0 - 14.5 %    Platelets 158 140 - 440 thou/uL    MPV 11.0 8.5 - 12.5 fL    Neutrophils % 47 (L) 50 - 70 %    Lymphocytes % 38 20 - 40 %    Monocytes % 13 (H) 2 - 10 %    Eosinophils % 1 0 - 6 %    Basophils % 1 0 - 2 %    Neutrophils Absolute 2.4 2.0 - 7.7 thou/uL    Lymphocytes Absolute 2.0 0.8 - 4.4 thou/uL    Monocytes Absolute 0.7 0.0 - 0.9 thou/uL    Eosinophils Absolute 0.1 0.0 - 0.4 thou/uL    Basophils Absolute 0.1 0.0 - 0.2 thou/uL         Imaging Results     No results found.      Abel Landrum MD

## 2021-06-19 NOTE — PROGRESS NOTES
"Assessment / Impression     1. Petechiae of palate  Ambulatory referral to Hematology   2. Petechiae or ecchymoses  Ambulatory referral to Hematology   3. Thrombocytopenia (H)     4. Easy bruising  HM1(CBC and Differential)    INR    APTT(PTT)    HM1 (CBC with Diff)    Ambulatory referral to Hematology         Plan:     Given patient's significant bleeding, we did order a CBC, and my  came to let me know that patient's platelet count is 2 thou/uL. I did also speak with hematology, and we both agree patient should be admitted to the hospital for further evaluation and treatment.  Patient states she was going to go home to get belongings, and will head to [facility] emergency department.  I signed patient out to provider at [facility], and they are expecting patient to arrive.    Subjective:      HPI: Joan E Mucha is a 67 y.o. female who presents for \"blood blisters in my mouth and skin.\" Patient states 2 days ago had spent significant amount of time outside doing yardwork.  She scratched her skin and noted significant bruising and petechiae anywhere she scratched her skin.  She also ate popcorn that night and sudden noted bleeding within her mouth.  Patient also flosses frequently, and when she flossed 2 nights ago, she noted significant bleeding that would not stop for an extended period of time.  She has not flossed since then, however has noted that in the middle the night she will need to spit out blood.  She has not had any fevers, chills, sweats.  She has felt slightly more fatigued than usual over the last few weeks.    Patient denies any current medication use, aside from approximately 1 week ago finishing a Medrol Dosepak.    She is also planning to have right Achilles reconstruction surgery done in August.      Medical History:     Patient Active Problem List   Diagnosis     Arthritis     Headache     Allergies     Obesity     Vertigo     Lump Or Mass In Breast     Morbid obesity with BMI of " "40.0-44.9, adult (H)       History reviewed. No pertinent past medical history.    History reviewed. No pertinent surgical history.    Current Medications:     No current outpatient prescriptions on file.       Family History:   History reviewed. No pertinent family history.    Review of Systems  All other systems reviewed and are negative.         Social History:     History   Smoking Status     Never Smoker   Smokeless Tobacco     Never Used     Social History     Social History Narrative         Objective:     /82  Pulse 72  Resp 16  Ht 5' 3\" (1.6 m)  Wt 204 lb (92.5 kg)  BMI 36.14 kg/m2  Physical Examination: General appearance - alert, well appearing, and in no distress  Eyes: pupils equal and reactive, extraocular eye movements intact  Mouth: mucous membranes moist, multiple areas of dried blood and purpura noted on buccal mucosa, tongue, and hard palate.    Neck: supple, no significant adenopathy or thyromegaly  Lungs: clear to auscultation, no wheezes, rales or rhonchi, symmetric air entry  Heart: normal rate, regular rhythm, normal S1, S2, no murmurs.  Lymph: no axillary lymphadenopathy noted.    Abdomen: soft, nontender, nondistended, no masses or organomegaly  Skin: there are multiple areas of purpura and petechiae noted on patients upper extremities bilaterally as well as multiple patches of purpura on her left anterior chest.  Extremities: No edema, no clubbing or cyanosis  Psychiatric: Normal affect. Does not appear anxious or depressed.    No results found for this or any previous visit (from the past 168 hour(s)).      Lacie Snell MD  7/6/2018  8:55 AM        "

## 2021-06-20 NOTE — PROGRESS NOTES
Preoperative Exam    Scheduled Procedure: Ankle and foot   Surgery Date:  10/03/2018   Surgery Location: [facility]  (Fax: [phone])  Surgeon:  Dr. Alan Gilbert     Assessment/Plan:     1. Preoperative examination  Disorder of Right Achilles Tendon    2. History of ITP  Platelet has now normalized.  Most recent platelet count today normal, and per hematology, ok to have surgery from hematology standpoint.  Plan to recheck platelets monthly for 2 more months  - Platelet Count  - Platelet Count; Future  - Platelet Count; Future    3. Grief reaction  4. Adjustment insomnia  Offered referral to psychology/counseling, the patient declined at this time.  We did discuss other medication use of such as trazodone.  She was given a prescription for trazodone  mg nightly as needed.  Discussed possible side effects of medication.  I would like her to follow-up with me in 1 month to see how she is doing.    5. Screen for colon cancer  6. Visit for screening mammogram  Routine screenings discussed today.        Surgical Procedure Risk: Intermediate (reported cardiac risk generally 1-5%)  Have you had prior anesthesia?: Yes  Have you or any family members had a previous anesthesia reaction:  No  Do you or any family members have a history of a clotting or bleeding disorder?: Yes, Thrombocytopenia.   Cardiac Risk Assessment: no increased risk for major cardiac complications    Patient approved for surgery with general or local anesthesia.        Functional Status: Independent  Patient plans to recover at home with family.     Subjective:      Joan E Mucha is a 68 y.o. female who presents for a preoperative consultation.  She has had ongoing bilateral achilles pain, initially had surgery scheduled earlier this year, however, had episode of ITP, likely due to H. Pylori, which caused surgery to be postponed.      Her son also unexpectedly  2 weeks ago.  Patient has had difficulty sleeping at night as a result.   Has tried Unisom and diphenhydramine, which may sometimes help, though it does not allow patient to stay asleep.    Otherwise, no other complaints or concerns.  No shortness of breath or difficulty breathing.      All other systems reviewed and are negative, other than those listed in the HPI.    Pertinent History  Do you have difficulty breathing or chest pain after walking up a flight of stairs: No  History of obstructive sleep apnea: No  Steroid use in the last 6 months: Yes: Treated with IV dexamethasone for ITP in July, then discharged on 3 cycles of oral dexamethasone 4 days on, 4 days off.    Frequent Aspirin/NSAID use: No  Prior Blood Transfusion: No  Prior Blood Transfusion Reaction: No  If for some reason prior to, during or after the procedure, if it is medically indicated, would you be willing to have a blood transfusion?:  There is no transfusion refusal.    Current Outpatient Prescriptions   Medication Sig Dispense Refill     calcium-vitamin D (CALCIUM-VITAMIN D) 500 mg(1,250mg) -200 unit per tablet Take 1 tablet by mouth 2 (two) times a day.       multivitamin with minerals (HAIR,SKIN AND NAILS) tablet Take 1 tablet by mouth at bedtime.       omeprazole (PRILOSEC) 20 MG capsule Take 1 capsule (20 mg total) by mouth 2 (two) times a day before meals. (Patient taking differently: Take 20 mg by mouth daily before breakfast. ) 60 capsule 0     traZODone (DESYREL) 50 MG tablet Take 0.5-2 tablets ( mg total) by mouth at bedtime as needed for sleep. 60 tablet 1     No current facility-administered medications for this visit.         Allergies   Allergen Reactions     Cefuroxime Axetil      Syncope       Sulfa (Sulfonamide Antibiotics) Hives       Patient Active Problem List   Diagnosis     Arthritis     Headache     Allergies     Obesity     Vertigo     Lump Or Mass In Breast     Obesity, Class II, BMI 35-39.9     Helicobacter pylori (H. pylori) infection       Past Medical History:   Diagnosis Date      "Acute ITP (H)      History of ITP        Past Surgical History:   Procedure Laterality Date     APPENDECTOMY        SECTION       FOOT SURGERY Bilateral     tenex removal     NOSE SURGERY      reconstruction       Social History     Social History     Marital status:      Spouse name: N/A     Number of children: N/A     Years of education: N/A     Occupational History     Not on file.     Social History Main Topics     Smoking status: Never Smoker     Smokeless tobacco: Never Used     Alcohol use Yes      Comment: rarely     Drug use: No     Sexual activity: No     Other Topics Concern     Not on file     Social History Narrative    Retired.  Worked at [employer] previously.         Patient Care Team:  Lacie Snell MD as PCP - General (Family Medicine)          Objective:     Vitals:    10/01/18 1434   BP: 108/62   Pulse: 74   Resp: 14   Weight: 212 lb 14.4 oz (96.6 kg)   Height: 5' 3\" (1.6 m)         Physical Exam:  General Appearance: Alert, cooperative, no distress, appears stated age  Head: Normocephalic, without obvious abnormality, atraumatic  Eyes: PERRL, conjunctiva/corneas clear, EOM's intact  Ears: Normal TM's and external ear canals, both ears  Throat: Lips, mucosa, and tongue normal; teeth and gums normal  Neck: Supple, symmetrical, trachea midline, no adenopathy;  thyroid: not enlarged, symmetric, no tenderness/mass/nodules;   Back: Symmetric, no curvature, ROM normal, no CVA tenderness  Lungs: Clear to auscultation bilaterally, respirations unlabored  Heart: Regular rate and rhythm, no murmur.   Abdomen: Soft, non-tender, bowel sounds active all four quadrants,  no masses, no organomegaly  Extremities: Extremities normal, atraumatic, no cyanosis or edema.  No tenderness to palpation of achilles tendons bilaterally.  Skin: Skin color, texture, turgor normal, no rashes or lesions  Lymph nodes: Cervical, supraclavicular, and axillary nodes normal  Neurologic: Alert.   Psych: Teary " intermittently throughout visit.  Well groomed, normal speech pattern.          There are no Patient Instructions on file for this visit.        Labs:  Recent Results (from the past 24 hour(s))   Platelet Count    Collection Time: 10/01/18  3:10 PM   Result Value Ref Range    Platelets 177 140 - 440 thou/uL     Vitamin D Level: 31.5 ng/dL (6/28/18)    Immunization History   Administered Date(s) Administered     Tdap 01/27/2014           Electronically signed by Lacie Snell MD 10/01/18 2:37 PM

## 2021-06-21 NOTE — PROGRESS NOTES
Assessment / Impression     1. History of ITP  Platelet Count   2. Grief reaction     3. Other insomnia             Plan:     I reviewed today's platelet count with patient.  We will plan to have her return to clinic next month to recheck platelet count again, sooner if she has any questions or concerns.  I did also give her a refill of her trazodone, advised to not use medication with any other hypnotic medications.  Follow-up in 1 month.  Patient understands, agrees with plan.    Subjective:      HPI: Joan E Mucha is a 68 y.o. female who presents for platelet lab follow-up.  Please see prior clinic notes; briefly, patient saw me in July of this year with prolonged bleeding, found to have platelet count of 2 thou/uL, was admitted to hospital and diagnosed with ITP.  It was found that this was likely due to H pylori.  Since treatment, patient has been stable.  We did check her platelet count approximately 1 month ago, which was within normal range.  She had her ankle surgery, has been doing well from her ankle standpoint since then.  She denies any spontaneous or prolonged bleeding.  No easy bruising.    From a mood standpoint, her son  unexpectedly in September this year.  She has still had difficulty sleeping.  She is currently taking trazodone  mg at night, though is not needing the medication nightly.  Feels like she is taking 1 day at a time.      Medical History:     Patient Active Problem List   Diagnosis     Arthritis     Headache     Allergies     Obesity     Vertigo     Lump Or Mass In Breast     Obesity, Class II, BMI 35-39.9     Helicobacter pylori (H. pylori) infection       Past Medical History:   Diagnosis Date     Acute ITP (H)      History of ITP        Past Surgical History:   Procedure Laterality Date     APPENDECTOMY        SECTION       FOOT SURGERY Bilateral     tenex removal     NOSE SURGERY      reconstruction       Current Medications:     Current Outpatient Prescriptions  "  Medication Sig     calcium-vitamin D (CALCIUM-VITAMIN D) 500 mg(1,250mg) -200 unit per tablet Take 1 tablet by mouth 2 (two) times a day.     multivitamin with minerals (HAIR,SKIN AND NAILS) tablet Take 1 tablet by mouth at bedtime.     omeprazole (PRILOSEC) 20 MG capsule Take 1 capsule (20 mg total) by mouth 2 (two) times a day before meals. (Patient taking differently: Take 20 mg by mouth daily before breakfast. )     traZODone (DESYREL) 50 MG tablet Take 0.5-3 tablets ( mg total) by mouth at bedtime as needed for sleep.       Family History:     Family History   Problem Relation Age of Onset     COPD Mother      No Medical Problems Sister      No Medical Problems Brother      Aneurysm Sister      Cancer Sister      Cancer Sister      Breast cancer Sister      No Medical Problems Brother      No Medical Problems Brother      No Medical Problems Son      No Medical Problems Son      No Medical Problems Daughter        Review of Systems  All other systems reviewed and are negative.         Social History:     History   Smoking Status     Never Smoker   Smokeless Tobacco     Never Used     Social History     Social History Narrative    Retired.  Worked at [employer] previously.           Objective:     /70 (Patient Site: Right Arm, Patient Position: Sitting, Cuff Size: Adult Large)  Pulse 72  Resp 20  Ht 5' 3\" (1.6 m)  Wt 212 lb (96.2 kg)  BMI 37.55 kg/m2  Physical Examination: General appearance - alert, well appearing, and in no distress  Eyes: pupils equal and reactive, extraocular eye movements intact  Mouth: mucous membranes moist, pharynx normal without lesions  Skin: no bruising  Extremities: No edema, no clubbing or cyanosis  Psychiatric: Normal affect. Does not appear anxious or depressed.    Recent Results (from the past 168 hour(s))   Platelet Count   Result Value Ref Range    Platelets 146 140 - 440 [facility]/Robbie Snell MD  11/6/2018  3:39 PM        "

## 2021-06-22 NOTE — PROGRESS NOTES
Assessment / Impression     1. History of ITP  Platelet Count   2. Grief reaction  Ambulatory referral to Psychology   3. Hair loss  HM1(CBC and Differential)    Thyroid Cascade    HM1 (CBC with Diff)         Plan:     I reviewed today's normal platelet lab results with patient.  She has had 3 monthly platelet checks which have been normal.  At this time, we could plan to just check her platelets as needed.    Due to the recent unexpected death of her son, patient is continuing to grieve the loss of her son, particularly with a lot of unanswered questions.  I did encourage patient to meet with a therapist, which she is agreeable to do.  Referral to psychology was ordered today.  We did also discuss possibly starting daily medication, though patient states she would not be good at taking a daily medication, not interested at this time.    In regard to hair loss, this may be likely related to recent stress of the death of her son approximately 3 months ago.  We can check CBC and thyroid labs today, though I did discuss usual cycle of hair loss with patient.        Patient is also thinking of having cataract removal in a few months, she will plan to return for preop at that time.    Subjective:      HPI: Joan E Mucha is a 68 y.o. female who presents for ITP follow-up.  Briefly, patient diagnosed with ITP in July, here for follow-up of monthly platelets, which have thankfully been stable over the last few months.  She denies any abnormal bleeding or bruising.    However, patient mood wise, does not feel well.  Her son  unexpectedly in 2018, patient is still dealing with grief.  She tried going to a grief group, though had a difficult time there, has not returned, though would like to return in January.  She has been taking trazodone up to 150 mg at night to help with sleep, has backed down to 100 mg, last night tried 75 mg, though it was not helpful, as she had difficulty sleeping.    Over the last few  weeks, she has also noted significant generalized hair loss, to the point where she has areas of thinning of the hair, and when she washes her hair has several strands of hair each time she runs her hands to her fingers.      Medical History:     Patient Active Problem List   Diagnosis     Arthritis     Headache     Allergies     Obesity     Vertigo     Lump Or Mass In Breast     Obesity, Class II, BMI 35-39.9     Helicobacter pylori (H. pylori) infection       Past Medical History:   Diagnosis Date     Acute ITP (H)      History of ITP        Past Surgical History:   Procedure Laterality Date     APPENDECTOMY        SECTION       FOOT SURGERY Bilateral     tenex removal     NOSE SURGERY      reconstruction       Current Medications:     Current Outpatient Medications   Medication Sig     calcium-vitamin D (CALCIUM-VITAMIN D) 500 mg(1,250mg) -200 unit per tablet Take 1 tablet by mouth 2 (two) times a day.     multivitamin with minerals (HAIR,SKIN AND NAILS) tablet Take 1 tablet by mouth at bedtime.     omeprazole (PRILOSEC) 20 MG capsule Take 1 capsule (20 mg total) by mouth 2 (two) times a day before meals. (Patient taking differently: Take 20 mg by mouth daily before breakfast. )     traZODone (DESYREL) 50 MG tablet Take 0.5-3 tablets ( mg total) by mouth at bedtime as needed for sleep.       Family History:     Family History   Problem Relation Age of Onset     COPD Mother      No Medical Problems Sister      No Medical Problems Brother      Aneurysm Sister      Cancer Sister      Cancer Sister      Breast cancer Sister      No Medical Problems Brother      No Medical Problems Brother      No Medical Problems Son      No Medical Problems Son      No Medical Problems Daughter        Review of Systems  All other systems reviewed and are negative.         Social History:     Social History     Tobacco Use   Smoking Status Never Smoker   Smokeless Tobacco Never Used     Social History     Social History  "Narrative    Retired.  Worked at [employer] previously.           Objective:     /80 (Patient Site: Left Arm, Patient Position: Sitting, Cuff Size: Adult Large)   Pulse 70   Ht 5' 3\" (1.6 m)   Wt 218 lb 5 oz (99 kg)   SpO2 97%   BMI 38.67 kg/m    Physical Examination: General appearance - alert, well appearing, and in no distress  Eyes: pupils equal and reactive, extraocular eye movements intact  Mouth: mucous membranes moist, pharynx normal without lesions.  No signs of bleeding  Neck: supple, no significant adenopathy or thyromegaly  Lungs: clear to auscultation, no wheezes, rales or rhonchi, symmetric air entry  Heart: normal rate, regular rhythm, normal S1, S2, no murmurs.  Skin: no abnormal bruising or skin changes  Extremities: No edema, no clubbing or cyanosis  Psychiatric: Normal affect. Appears mildly depressed.  Denies active suicidal or homicidal ideation.      Recent Results (from the past 168 hour(s))   Platelet Count   Result Value Ref Range    Platelets 181 140 - 440 thou/uL   HM1 (CBC with Diff)   Result Value Ref Range    WBC 6.2 4.0 - 11.0 thou/uL    RBC 4.43 3.80 - 5.40 mill/uL    Hemoglobin 13.3 12.0 - 16.0 g/dL    Hematocrit 39.4 35.0 - 47.0 %    MCV 89 80 - 100 fL    MCH 30.1 27.0 - 34.0 pg    MCHC 33.9 32.0 - 36.0 g/dL    RDW 12.5 11.0 - 14.5 %    Platelets 171 140 - 440 thou/uL    MPV 9.9 7.0 - 10.0 fL    Neutrophils % 51 50 - 70 %    Lymphocytes % 36 20 - 40 %    Monocytes % 10 2 - 10 %    Eosinophils % 2 0 - 6 %    Basophils % 1 0 - 2 %    Neutrophils Absolute 3.1 2.0 - 7.7 thou/uL    Lymphocytes Absolute 2.2 0.8 - 4.4 thou/uL    Monocytes Absolute 0.6 0.0 - 0.9 thou/uL    Eosinophils Absolute 0.1 0.0 - 0.4 thou/uL    Basophils Absolute 0.1 0.0 - 0.2 thou/uL         Lacie Snell MD  12/11/2018  11:40 AM        "

## 2021-07-21 ENCOUNTER — RECORDS - HEALTHEAST (OUTPATIENT)
Dept: ADMINISTRATIVE | Facility: CLINIC | Age: 71
End: 2021-07-21

## 2021-08-03 PROBLEM — E66.01 MORBID OBESITY WITH BMI OF 40.0-44.9, ADULT (H): Status: RESOLVED | Noted: 2017-08-15 | Resolved: 2018-07-10

## 2021-08-03 PROBLEM — D69.6 THROMBOCYTOPENIA (H): Status: RESOLVED | Noted: 2018-07-06 | Resolved: 2018-10-01

## 2021-08-15 ENCOUNTER — HEALTH MAINTENANCE LETTER (OUTPATIENT)
Age: 71
End: 2021-08-15

## 2021-10-10 ENCOUNTER — HEALTH MAINTENANCE LETTER (OUTPATIENT)
Age: 71
End: 2021-10-10

## 2022-09-24 ENCOUNTER — HEALTH MAINTENANCE LETTER (OUTPATIENT)
Age: 72
End: 2022-09-24

## 2023-10-08 ENCOUNTER — HEALTH MAINTENANCE LETTER (OUTPATIENT)
Age: 73
End: 2023-10-08

## 2025-01-02 ENCOUNTER — APPOINTMENT (OUTPATIENT)
Dept: CT IMAGING | Facility: HOSPITAL | Age: 75
End: 2025-01-02
Payer: COMMERCIAL

## 2025-01-02 ENCOUNTER — HOSPITAL ENCOUNTER (EMERGENCY)
Facility: HOSPITAL | Age: 75
Discharge: HOME | End: 2025-01-02
Attending: EMERGENCY MEDICINE
Payer: COMMERCIAL

## 2025-01-02 ENCOUNTER — APPOINTMENT (OUTPATIENT)
Dept: RADIOLOGY | Facility: HOSPITAL | Age: 75
End: 2025-01-02
Attending: EMERGENCY MEDICINE
Payer: COMMERCIAL

## 2025-01-02 VITALS
HEART RATE: 98 BPM | DIASTOLIC BLOOD PRESSURE: 99 MMHG | OXYGEN SATURATION: 95 % | BODY MASS INDEX: 42.34 KG/M2 | WEIGHT: 239 LBS | RESPIRATION RATE: 18 BRPM | SYSTOLIC BLOOD PRESSURE: 175 MMHG | TEMPERATURE: 99.4 F

## 2025-01-02 DIAGNOSIS — R51.9 UNILATERAL OCCIPITAL HEADACHE: ICD-10-CM

## 2025-01-02 DIAGNOSIS — B33.8 RSV INFECTION: ICD-10-CM

## 2025-01-02 LAB
ANION GAP SERPL CALCULATED.3IONS-SCNC: 12 MMOL/L (ref 7–15)
BUN SERPL-MCNC: 9.7 MG/DL (ref 8–23)
CALCIUM SERPL-MCNC: 10.3 MG/DL (ref 8.8–10.4)
CHLORIDE SERPL-SCNC: 103 MMOL/L (ref 98–107)
CREAT SERPL-MCNC: 0.8 MG/DL (ref 0.51–0.95)
EGFRCR SERPLBLD CKD-EPI 2021: 78 ML/MIN/1.73M2
ERYTHROCYTE [DISTWIDTH] IN BLOOD BY AUTOMATED COUNT: 13.7 % (ref 10–15)
FLUAV RNA SPEC QL NAA+PROBE: NEGATIVE
FLUBV RNA RESP QL NAA+PROBE: NEGATIVE
GLUCOSE SERPL-MCNC: 118 MG/DL (ref 70–99)
HCO3 SERPL-SCNC: 23 MMOL/L (ref 22–29)
HCT VFR BLD AUTO: 43 % (ref 35–47)
HGB BLD-MCNC: 14.2 G/DL (ref 11.7–15.7)
MCH RBC QN AUTO: 30 PG (ref 26.5–33)
MCHC RBC AUTO-ENTMCNC: 33 G/DL (ref 31.5–36.5)
MCV RBC AUTO: 91 FL (ref 78–100)
PLATELET # BLD AUTO: 170 10E3/UL (ref 150–450)
POTASSIUM SERPL-SCNC: 4.2 MMOL/L (ref 3.4–5.3)
RBC # BLD AUTO: 4.73 10E6/UL (ref 3.8–5.2)
RSV RNA SPEC NAA+PROBE: POSITIVE
SARS-COV-2 RNA RESP QL NAA+PROBE: NEGATIVE
SODIUM SERPL-SCNC: 138 MMOL/L (ref 135–145)
WBC # BLD AUTO: 15.2 10E3/UL (ref 4–11)

## 2025-01-02 PROCEDURE — 36415 COLL VENOUS BLD VENIPUNCTURE: CPT

## 2025-01-02 PROCEDURE — 80048 BASIC METABOLIC PNL TOTAL CA: CPT

## 2025-01-02 PROCEDURE — 87637 SARSCOV2&INF A&B&RSV AMP PRB: CPT | Performed by: EMERGENCY MEDICINE

## 2025-01-02 PROCEDURE — 85018 HEMOGLOBIN: CPT

## 2025-01-02 PROCEDURE — 250N000011 HC RX IP 250 OP 636

## 2025-01-02 PROCEDURE — 70450 CT HEAD/BRAIN W/O DYE: CPT

## 2025-01-02 PROCEDURE — 250N000013 HC RX MED GY IP 250 OP 250 PS 637: Performed by: EMERGENCY MEDICINE

## 2025-01-02 PROCEDURE — 71046 X-RAY EXAM CHEST 2 VIEWS: CPT

## 2025-01-02 PROCEDURE — 258N000003 HC RX IP 258 OP 636

## 2025-01-02 RX ORDER — METOCLOPRAMIDE HYDROCHLORIDE 5 MG/ML
5 INJECTION INTRAMUSCULAR; INTRAVENOUS ONCE
Status: COMPLETED | OUTPATIENT
Start: 2025-01-02 | End: 2025-01-02

## 2025-01-02 RX ORDER — ACETAMINOPHEN 325 MG/1
650 TABLET ORAL ONCE
Status: DISCONTINUED | OUTPATIENT
Start: 2025-01-02 | End: 2025-01-02

## 2025-01-02 RX ORDER — DIPHENHYDRAMINE HYDROCHLORIDE 50 MG/ML
25 INJECTION INTRAMUSCULAR; INTRAVENOUS ONCE
Status: COMPLETED | OUTPATIENT
Start: 2025-01-02 | End: 2025-01-02

## 2025-01-02 RX ORDER — OXYCODONE AND ACETAMINOPHEN 5; 325 MG/1; MG/1
1 TABLET ORAL ONCE
Status: COMPLETED | OUTPATIENT
Start: 2025-01-02 | End: 2025-01-02

## 2025-01-02 RX ORDER — OXYCODONE HYDROCHLORIDE 5 MG/1
5 TABLET ORAL EVERY 6 HOURS PRN
Qty: 4 TABLET | Refills: 0 | Status: SHIPPED | OUTPATIENT
Start: 2025-01-02 | End: 2025-01-06

## 2025-01-02 RX ORDER — VALACYCLOVIR HYDROCHLORIDE 1 G/1
1000 TABLET, FILM COATED ORAL 3 TIMES DAILY
Qty: 21 TABLET | Refills: 0 | Status: SHIPPED | OUTPATIENT
Start: 2025-01-02 | End: 2025-01-09

## 2025-01-02 RX ADMIN — METOCLOPRAMIDE 5 MG: 5 INJECTION, SOLUTION INTRAMUSCULAR; INTRAVENOUS at 20:22

## 2025-01-02 RX ADMIN — OXYCODONE HYDROCHLORIDE AND ACETAMINOPHEN 1 TABLET: 5; 325 TABLET ORAL at 23:27

## 2025-01-02 RX ADMIN — DIPHENHYDRAMINE HYDROCHLORIDE 25 MG: 50 INJECTION, SOLUTION INTRAMUSCULAR; INTRAVENOUS at 20:22

## 2025-01-02 RX ADMIN — SODIUM CHLORIDE 1000 ML: 9 INJECTION, SOLUTION INTRAVENOUS at 20:23

## 2025-01-02 ASSESSMENT — COLUMBIA-SUICIDE SEVERITY RATING SCALE - C-SSRS
6. HAVE YOU EVER DONE ANYTHING, STARTED TO DO ANYTHING, OR PREPARED TO DO ANYTHING TO END YOUR LIFE?: NO
1. IN THE PAST MONTH, HAVE YOU WISHED YOU WERE DEAD OR WISHED YOU COULD GO TO SLEEP AND NOT WAKE UP?: NO
2. HAVE YOU ACTUALLY HAD ANY THOUGHTS OF KILLING YOURSELF IN THE PAST MONTH?: NO

## 2025-01-02 NOTE — ED NOTES
Patient  approached this writer stating the patient's headache is worse than when she checked in, provider notified, patient declined covid test.  Provider to consult.

## 2025-01-02 NOTE — ED TRIAGE NOTES
The pt arrives with c/o of a headache that started yesterday. Has a hx of shingles on her scalp and face in 2017 with a similar headache but she hasn't noticed any blisters. She denies a hx of recent trauma but has had nasal congestion and cough.      Triage Assessment (Adult)       Row Name 01/02/25 5792          Triage Assessment    Airway WDL WDL        Cognitive/Neuro/Behavioral WDL    Cognitive/Neuro/Behavioral WDL WDL

## 2025-01-03 NOTE — ED PROVIDER NOTES
"Emergency Department Midlevel Supervisory Note     I had a face to face encounter with this patient seen by the Advanced Practice Provider ([facility]). I personally made/approved the management plan and take responsibility for the patient management. I personally saw patient and performed a substantive portion of the visit including all aspects of the medical decision making.     ED Course:  6:54 PM Brisa Oneill PA-C staffed patient with me. I agree with their assessment and plan of management, and I will see the patient.  10:30 PM I met with the patient to introduce myself, gather additional history, perform my initial exam, and discuss the plan.     Brief HPI:     Joan E Mucha is a 74 year old female who presents for evaluation of headache and cough.      Patient reports that she has had a headache for the past 2 days located on the right posterior part of her head.  She says this is similar to her headache that she had back when she had shingles in 2017 however at that time the headache was on the left side.  No vision changes with the headache.  No weakness or numbness of the limbs.  No history of stroke.     She states she gets \"spells\" of the headache for gets worse.  When initially started it was a gradual onset.     Today she took ibuprofen, naproxen, aspirin, Tylenol prior to arrival.  Little improvement.     Patient also notes a cough that she has been for the last 2 weeks.  She states \"she has bronchitis\".  She also believes she has a sinus infection because she has been congested for the past 3 days.     I, Leeroy Marroquin, am serving as a scribe to document services personally performed by Dr. Sarath Hay, based on my observations and the provider's statements to me. I, Dr. Sarath Hay, attest that Leeroy Marroquin is acting in a scribe capacity, has observed my performance of the services and has documented them in accordance with my direction.     Brief Physical Exam: BP (!) 175/99   Pulse 98   " Temp 99.4  F (37.4  C) (Oral)   Resp 18   Wt 108.4 kg (239 lb)   SpO2 95%   BMI 42.34 kg/m    Constitutional:  Alert, in no acute distress  EYES: Conjunctivae clear  HENT:    Respiratory:  Respirations even, unlabored, in no acute respiratory distress  Cardiovascular:  Regular rate and rhythm, good peripheral perfusion  GI: Soft, non-distended, non-tender  Musculoskeletal:  Moves all 4 extremities equally, grossly symmetrical strength  Integument: Warm & dry. No appreciable rash, erythema.  Neurologic:  Alert & oriented, speech clear and fluent, no focal deficits noted  Psych: Normal mood and affect       MDM:  ***       1. Unilateral occipital headache    2. RSV infection        Consults:  I discussed the patient with *** who recommends ***    Labs and Imaging:  Results for orders placed or performed during the hospital encounter of 01/02/25   Chest XR,  PA & LAT    Impression    IMPRESSION: Subtle opacity in the left lower lung may represent fluid in the fissure and/or atelectasis. No evidence for CHF or pneumonia otherwise. No pneumothorax. Normal heart size.   Head CT w/o contrast    Impression    IMPRESSION:  1.  No acute intracranial process.   Influenza A/B, RSV and SARS-CoV2 PCR (COVID-19) Nasopharyngeal    Specimen: Nasopharyngeal; Swab   Result Value Ref Range    Influenza A PCR Negative Negative    Influenza B PCR Negative Negative    RSV PCR Positive (A) Negative    SARS CoV2 PCR Negative Negative   CBC (+ platelets, no diff)   Result Value Ref Range    WBC Count 15.2 (H) 4.0 - 11.0 10e3/uL    RBC Count 4.73 3.80 - 5.20 10e6/uL    Hemoglobin 14.2 11.7 - 15.7 g/dL    Hematocrit 43.0 35.0 - 47.0 %    MCV 91 78 - 100 fL    MCH 30.0 26.5 - 33.0 pg    MCHC 33.0 31.5 - 36.5 g/dL    RDW 13.7 10.0 - 15.0 %    Platelet Count 170 150 - 450 10e3/uL   Basic metabolic panel   Result Value Ref Range    Sodium 138 135 - 145 mmol/L    Potassium 4.2 3.4 - 5.3 mmol/L    Chloride 103 98 - 107 mmol/L    Carbon Dioxide  (CO2) 23 22 - 29 mmol/L    Anion Gap 12 7 - 15 mmol/L    Urea Nitrogen 9.7 8.0 - 23.0 mg/dL    Creatinine 0.80 0.51 - 0.95 mg/dL    GFR Estimate 78 >60 mL/min/1.73m2    Calcium 10.3 8.8 - 10.4 mg/dL    Glucose 118 (H) 70 - 99 mg/dL       I have reviewed the relevant laboratory studies above.    I independently interpreted the following imaging study(s):   ***    EKG: I reviewed and independently interpreted the patient's EKG, with comments made as listed below. Please see scanned EKG for full report.   ***    Procedures:  I was present for the key portions of procedures documented in [facility]/midlevel note, see midlevel note for further details.    Sarath Hay MD  [facility] EMERGENCY DEPARTMENT  [address] [place] [id number] [phone]

## 2025-01-03 NOTE — DISCHARGE INSTRUCTIONS
Your evaluation today looked stable including a CT scan and x-ray.  Your RSV test was positive.  This is a viral upper respiratory illness that can cause headache fever cough and congestion.  Your head CT was negative.  We discussed we think it is too early to necessarily start an antiviral treatment for recurrent shingles infection but we can send this for you.  If you do notice a rash in this area you can certainly start the medication.  We would recommend continuing on Tylenol 650 mg and ibuprofen 400 mg every 6 hours for  pain or fever control.  You can use oxycodone on a very limited basis for the next couple of days to help get control of pain.  Please follow-up in your clinic as soon as possible after this emergency department visit to review any ongoing symptoms

## 2025-01-03 NOTE — ED PROVIDER NOTES
"Emergency Department Encounter   NAME: Joan E Mucha ; AGE: 74 year old female ; YOB: 1950 ; MRN: [id number] ; PCP: Lacie Snell   ED PROVIDER: Brisa Oneill PA-C    Evaluation Date & Time:   No admission date for patient encounter.    CHIEF COMPLAINT:  Headache and Flu Symptoms      Impression and Plan   MDM: Joan E Mucha is a 74 year old female who presents to the ED for evaluation of headache and cough.  The cough has been going on for the past 2 weeks and the headache started 3 days ago.    Differential includes COVID, flu, RSV, other viral URI, meningitis, atypical migraine, shingles, intracranial hemorrhage.  Patient has a normal neuroexam.  Ordered Reglan, fluids, Benadryl for headache treatment.  Unfortunately cannot order any other headache medications as patient took aspirin, naproxen, Tylenol, ibuprofen all prior to arrival.    Did consider intracranial hemorrhage with her history of 2-week cough prior to the onset of her headache although does not seem sudden in onset or have thunderclap history..  Ordered a noncontrast head CT for further evaluation.     Patient declined flu and COVID swabs because it is \"dangerous to stick those swabs up there.\"    Did consider herpes zoster shingles this patient notes this headache to be similar to when she had shingles on the other side of her face in 2017.  Did not appreciate any vesicular lesions or rashes around the area where she is having her headache.      Blood work remarkable for leukocytosis at 15 and positive RSV.    CT of the head is pending prior to discharge. Care signed out to Dr. Hay.    Medical Decision Making  Obtained supplemental history:Supplemental history obtained?: No  Reviewed external records: External records reviewed?: No  Care impacted by chronic illness:Documented in Chart  Did you consider but not order tests?: Work up considered but not performed and documented in chart, if applicable  Did you interpret images " independently?: Independent interpretation of ECG and images noted in documentation, when applicable.  Consultation discussion with other provider:Did you involve another provider (consultant, , pharmacy, etc.)?: I discussed the care with another health care provider, see documentation for details.  Care signed out.    MIPS: Not Applicable      Critical Care: 0    ED COURSE:  6:11 PM I met and introduced myself to the patient. I gathered initial history and performed my physical exam. We discussed plan for initial workup.  I have staffed the patient with Dr. Hay, ED MD, who has evaluated the patient and agrees with all aspects of today's care.  I rechecked the patient and discussed results, discharge, follow up, and reasons to return to the ED.     At the conclusion of the encounter I discussed the results of all the tests and the disposition. The questions were answered. The patient or family acknowledged understanding and was agreeable with the care plan.    FINAL IMPRESSION:  No diagnosis found.      MEDICATIONS GIVEN IN THE EMERGENCY DEPARTMENT:  Medications   metoclopramide (REGLAN) injection 5 mg (5 mg Intravenous $Given 1/2/25 2022)   diphenhydrAMINE (BENADRYL) injection 25 mg (25 mg Intravenous $Given 1/2/25 2022)   sodium chloride 0.9% BOLUS 1,000 mL (1,000 mLs Intravenous $New Bag 1/2/25 2023)         NEW PRESCRIPTIONS STARTED AT TODAY'S ED VISIT:  New Prescriptions    No medications on file         HPI   Use of Intrepreter: N/A     Joan E Mucha is a 74 year old female with a pertinent history of headaches, obesity, arthritis, H. pylori, vertigo who presents to the ED by self for evaluation of headache and cough.     Patient reports that she has had a headache for the past 2 days located on the right posterior part of her head.  She says this is similar to her headache that she had back when she had shingles in 2017 however at that time the headache was on the left side.  No vision changes with the  "headache.  No weakness or numbness of the limbs.  No history of stroke.    She states she gets \"spells\" of the headache for gets worse.  When initially started it was a gradual onset.    Today she took ibuprofen, naproxen, aspirin, Tylenol prior to arrival.  Little improvement.    Patient also notes a cough that she has been for the last 2 weeks.  She states \"she has bronchitis\".  She also believes she has a sinus infection because she has been congested for the past 3 days.   Medical History     No past medical history on file.    Past Surgical History:   Procedure Laterality Date    APPENDECTOMY       SECTION      FOOT SURGERY Bilateral     tenex removal    NOSE SURGERY      reconstruction       Family History   Problem Relation Age of Onset    Chronic Obstructive Pulmonary Disease Mother     No Known Problems Sister     No Known Problems Brother     Aneurysm Sister     Cancer Sister     Cancer Sister     Breast Cancer Sister     No Known Problems Brother     No Known Problems Brother     No Known Problems Son     No Known Problems Son     No Known Problems Daughter        Social History     Tobacco Use    Smoking status: Never    Smokeless tobacco: Never   Substance Use Topics    Alcohol use: Yes     Comment: Alcoholic Drinks/day: rarely    Drug use: No       calcium-vitamin D (CALCIUM-VITAMIN D) 500 mg(1,250mg) -200 unit per tablet  codeine-guaiFENesin (GUAIFENESIN AC)  mg/5 mL liquid  doxylamine (UNISOM) 25 mg tablet  HEMP OIL OR EXTRACT OR OTHER CBD CANNABINOID, NOT MEDICAL CANNABIS,  omeprazole (PRILOSEC) 20 MG capsule  traZODone (DESYREL) 50 MG tablet          Physical Exam     First Vitals:  Patient Vitals for the past 24 hrs:   BP Temp Temp src Pulse Resp SpO2 Weight   25 1601 (!) 148/88 99.4  F (37.4  C) Oral 88 18 95 % 108.4 kg (239 lb)         PHYSICAL EXAM:   Physical Exam    Constitutional: alert, no acute distress, has \"spells\" throughout exam where she appears in pain  Head: " normocephalic, atraumatic, no rashes   Eyes: EOM intact   Neck: ROM normal  Cardio: regular rate, regular rhythm, no murmurs   Pulmonary: effort normal, lung sounds normal, no wheezing, crackles, or rales    Abdominal: flat, no distention  MSK: no obvious swelling or deformity  Skin: no visible rashes or erythema   Neuro: no gross focal deficit, acting per baseline, normal strength and sensation   Psychiatric: mood and behavior within normal limit    Results     LAB:  All pertinent labs reviewed and interpreted  Labs Ordered and Resulted from Time of ED Arrival to Time of ED Departure   INFLUENZA A/B, RSV AND SARS-COV2 PCR - Abnormal       Result Value    Influenza A PCR Negative      Influenza B PCR Negative      RSV PCR Positive (*)     SARS CoV2 PCR Negative     CBC WITH PLATELETS - Abnormal    WBC Count 15.2 (*)     RBC Count 4.73      Hemoglobin 14.2      Hematocrit 43.0      MCV 91      MCH 30.0      MCHC 33.0      RDW 13.7      Platelet Count 170     BASIC METABOLIC PANEL - Abnormal    Sodium 138      Potassium 4.2      Chloride 103      Carbon Dioxide (CO2) 23      Anion Gap 12      Urea Nitrogen 9.7      Creatinine 0.80      GFR Estimate 78      Calcium 10.3      Glucose 118 (*)         RADIOLOGY:  Chest XR,  PA & LAT    (Results Pending)   Head CT w/o contrast    (Results Pending)           PROCEDURES:  Frederick Oneill PA-C   Emergency Medicine   [facility] EMERGENCY DEPARTMENT       Brisa Oneill PA-C  01/02/25 4691

## 2025-01-12 ENCOUNTER — HEALTH MAINTENANCE LETTER (OUTPATIENT)
Age: 75
End: 2025-01-12

## 2025-03-26 ENCOUNTER — HOSPITAL ENCOUNTER (OUTPATIENT)
Dept: GENERAL RADIOLOGY | Facility: HOSPITAL | Age: 75
Discharge: HOME | End: 2025-03-26
Attending: PHYSICIAN ASSISTANT
Payer: COMMERCIAL

## 2025-03-26 ENCOUNTER — OFFICE VISIT (OUTPATIENT)
Dept: URGENT CARE | Facility: URGENT CARE | Age: 75
End: 2025-03-26
Payer: COMMERCIAL

## 2025-03-26 VITALS
BODY MASS INDEX: 41.08 KG/M2 | DIASTOLIC BLOOD PRESSURE: 76 MMHG | RESPIRATION RATE: 18 BRPM | WEIGHT: 231.9 LBS | SYSTOLIC BLOOD PRESSURE: 118 MMHG | OXYGEN SATURATION: 95 % | TEMPERATURE: 98.1 F | HEART RATE: 94 BPM

## 2025-03-26 DIAGNOSIS — R51.9 RIGHT-SIDED HEADACHE: ICD-10-CM

## 2025-03-26 DIAGNOSIS — R05.2 SUBACUTE COUGH: Primary | ICD-10-CM

## 2025-03-26 DIAGNOSIS — R05.2 SUBACUTE COUGH: ICD-10-CM

## 2025-03-26 DIAGNOSIS — H10.13 ALLERGIC CONJUNCTIVITIS, BILATERAL: ICD-10-CM

## 2025-03-26 LAB — ERYTHROCYTE [SEDIMENTATION RATE] IN BLOOD BY WESTERGREN METHOD: 77 MM/HR (ref 0–30)

## 2025-03-26 PROCEDURE — 36415 COLL VENOUS BLD VENIPUNCTURE: CPT | Performed by: PHYSICIAN ASSISTANT

## 2025-03-26 PROCEDURE — 85652 RBC SED RATE AUTOMATED: CPT | Performed by: PHYSICIAN ASSISTANT

## 2025-03-26 PROCEDURE — 3074F SYST BP LT 130 MM HG: CPT | Performed by: PHYSICIAN ASSISTANT

## 2025-03-26 PROCEDURE — 71046 X-RAY EXAM CHEST 2 VIEWS: CPT

## 2025-03-26 PROCEDURE — 3078F DIAST BP <80 MM HG: CPT | Performed by: PHYSICIAN ASSISTANT

## 2025-03-26 PROCEDURE — 99204 OFFICE O/P NEW MOD 45 MIN: CPT | Performed by: PHYSICIAN ASSISTANT

## 2025-03-26 RX ORDER — TOBRAMYCIN 3 MG/ML
1 SOLUTION/ DROPS OPHTHALMIC 4 TIMES DAILY
Qty: 5 ML | Refills: 0 | Status: SHIPPED | OUTPATIENT
Start: 2025-03-26 | End: 2025-04-02

## 2025-03-26 RX ORDER — DOXYCYCLINE 100 MG/1
100 CAPSULE ORAL 2 TIMES DAILY
Qty: 20 CAPSULE | Refills: 0 | Status: SHIPPED | OUTPATIENT
Start: 2025-03-26 | End: 2025-04-05

## 2025-03-26 NOTE — PROGRESS NOTES
[facility]ARE [address] [address]  Phone: [phone]  Fax: [phone]    Patient:  Joan E Mucha, Date of birth 1950  Date of Visit:  03/26/2025  Referring Provider No ref. provider found    Patient presents with:  Headache: Off and on, on Rt side of head, has been using CBD oil and works sometimes        ICD-10-CM    1. Subacute cough  R05.2 XR Chest 2 Views     doxycycline hyclate (VIBRAMYCIN) 100 MG capsule      2. Right-sided headache  R51.9 ESR: Erythrocyte sedimentation rate     ESR: Erythrocyte sedimentation rate     CANCELED: CRP, inflammation      3. Allergic conjunctivitis, bilateral  H10.13 tobramycin (TOBREX) 0.3 % ophthalmic solution          Patient Instructions   Take Doxycycline twice per day with food. While on Doxycycline hold any medicines/supplements that contain heavy metals such as Zinc, Iron, Calcium, or Magnesium. Heavy metal supplements can block the absorption of the Doxycycline. Doxycycline can cause skin to be more sensitive to sunburn. Cover up/wear SPF while on this medicine.     Conjunctivitis:  Administer 1 drop to the affected eye four times a day x 7 days.   Practice good hand hygiene. Avoid sharing linens such as pillowcases, towels, or wash clothes. Wash these items on hot to prevent spreading.    Your X-ray appears normal. A final read will be completed by a Radiologist. You will be able to see the final read in your Mychart.     Your ESR (sedimentation rate) was elevated. This means we haven't ruled out temporal arteritis. I still have low suspicion given the intermittent nature and distribution of your head pain. That being said if you develop vision changes please seek emergency medical attention.     Assessment & Plan      Assessment  - Chronic cough and respiratory symptoms likely past the acute stage of RSV infection.  - Considered possibility for Temporal arteritis given where the head pain radiates to. ESR  ordered with the hope to rule this out, but it was elevated today. I discussed with the patient symptoms of temporal arteritis. It is less likely since there is significant pain behind the ear.   - Headaches more likely migrainous in nature, given unilateral, temporal, and occipital involvement.  - Possible sinus infection contributing to symptoms given longevity of congestion symptoms.     Plan  - Perform a chest x-ray to evaluate lung clarity.  - Conduct a pertussis test to rule out whooping cough.  - Perform an ESR blood test to check for inflammation and rule out temporal arteritis.  - Start treatment with doxycycline to cover sinus infection and potential pertussis.  - Advise holding calcium and vitamin D supplements during doxycycline treatment to avoid absorption interference.  - Recommend avoiding sun exposure and tanning beds while on doxycycline to prevent increased susceptibility to sunburn.  - Monitor for any changes in vision, particularly right eye pain or peripheral vision changes, as these could indicate a need for further evaluation.    Prescription  - doxycycline. Hold calcium and vitamin D supplements while on doxycycline. Increased risk of sunburn; avoid sun exposure and tanning beds.    Appointments  - Follow-up appointment on April 7 for intake to look for a new physician.         History of Present Illness     Pertinent history obtain from: patient    Joan E Mucha, a 74-year-old female, has been experiencing headaches on and off, primarily on the right side, similar to those she had in 2017 when she had shingles. She was seen in the ER in January for similar head pain and was started on Valtrex due to concerns about a possible recurrence of shingles, although she did not develop a rash. She reports a persistent cough since the beginning of January, which was attributed to RSV, and has been unable to shake it off. She also mentions having a sinus infection and experiencing dizziness, which she  describes as vertigo. 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 She has a history of glaucoma, with some vision loss in the lower field, and has been under treatment for a couple of years. Amada has been experiencing these headaches since January, with temporary relief at times, but they have become more persistent over the past 8 days. The headache is worst in the occipital area and radiates forward to the right ear.     Problem List:  2018-07: Helicobacter pylori (H. pylori) infection  2018-07: Obesity, Class II, BMI 35-39.9  2018-07: Thrombocytopenia  2017-08: Morbid obesity with BMI of 40.0-44.9, adult (H)  Arthritis  Headache  Allergies  Obesity  Vertigo  Lump Or Mass In Breast  Acute ITP (H)      No past medical history on file.    Social History     Tobacco Use    Smoking status: Never    Smokeless tobacco: Never   Substance Use Topics    Alcohol use: Yes     Comment: Alcoholic Drinks/day: rarely       Physical Exam     Physical Exam  Vitals and nursing note reviewed.   Constitutional:       General: She is not in acute distress.     Appearance: She is not toxic-appearing or diaphoretic.   HENT:      Head: Normocephalic and atraumatic.      Right Ear: Tympanic membrane, ear canal and external ear normal.      Left Ear: Tympanic membrane, ear canal and external ear normal.   Eyes:      General: Lids are normal.         Right eye: No discharge.         Left eye: No discharge.      Extraocular Movements: Extraocular movements intact.      Conjunctiva/sclera:      Right eye: Right conjunctiva is injected.      Left eye: Left conjunctiva is injected.      Pupils: Pupils are equal, round, and reactive to light.   Cardiovascular:      Rate and Rhythm: Normal rate and regular rhythm.   Pulmonary:      Effort: Pulmonary effort is normal. No respiratory distress.      Breath sounds: Normal breath sounds. No stridor. No  wheezing, rhonchi or rales.   Neurological:      Mental Status: She is alert.   Psychiatric:         Mood and Affect: Mood normal.         Behavior: Behavior normal.         Thought Content: Thought content normal.         Judgment: Judgment normal.         Vital signs:  /76   Pulse 94   Temp 98.1  F (36.7  C) (Oral)   Resp 18   Wt 105.2 kg (231 lb 14.4 oz)   SpO2 95%   BMI 41.08 kg/m        Data   Laboratory data and imaging listed below were reviewed as part of this encounter.     Results for orders placed or performed in visit on 03/26/25   ESR: Erythrocyte sedimentation rate     Status: Abnormal   Result Value Ref Range    Erythrocyte Sedimentation Rate 77 (H) 0 - 30 mm/hr                Consent was obtained from the patient to use an AI documentation tool in the creation of  this note     40 minutes spent on the date of the encounter doing chart review, history and examination, documentation, and further activities as noted.

## 2025-03-26 NOTE — PATIENT INSTRUCTIONS
Take Doxycycline twice per day with food. While on Doxycycline hold any medicines/supplements that contain heavy metals such as Zinc, Iron, Calcium, or Magnesium. Heavy metal supplements can block the absorption of the Doxycycline. Doxycycline can cause skin to be more sensitive to sunburn. Cover up/wear SPF while on this medicine.     Conjunctivitis:  Administer 1 drop to the affected eye four times a day x 7 days.   Practice good hand hygiene. Avoid sharing linens such as pillowcases, towels, or wash clothes. Wash these items on hot to prevent spreading.    Your X-ray appears normal. A final read will be completed by a Radiologist. You will be able to see the final read in your Mychart.     Your ESR (sedimentation rate) was elevated. This means we haven't ruled out temporal arteritis. I still have low suspicion given the intermittent nature and distribution of your head pain. That being said if you develop vision changes please seek emergency medical attention.